# Patient Record
Sex: FEMALE | Race: OTHER | HISPANIC OR LATINO | URBAN - METROPOLITAN AREA
[De-identification: names, ages, dates, MRNs, and addresses within clinical notes are randomized per-mention and may not be internally consistent; named-entity substitution may affect disease eponyms.]

---

## 2018-12-12 ENCOUNTER — EMERGENCY (EMERGENCY)
Facility: HOSPITAL | Age: 23
LOS: 1 days | Discharge: HOME | End: 2018-12-12
Attending: EMERGENCY MEDICINE
Payer: MEDICAID

## 2018-12-12 VITALS
RESPIRATION RATE: 18 BRPM | OXYGEN SATURATION: 99 % | SYSTOLIC BLOOD PRESSURE: 112 MMHG | WEIGHT: 240.08 LBS | TEMPERATURE: 98 F | HEIGHT: 64 IN | DIASTOLIC BLOOD PRESSURE: 66 MMHG | HEART RATE: 111 BPM

## 2018-12-12 VITALS
OXYGEN SATURATION: 98 % | DIASTOLIC BLOOD PRESSURE: 84 MMHG | HEART RATE: 91 BPM | SYSTOLIC BLOOD PRESSURE: 109 MMHG | RESPIRATION RATE: 17 BRPM | TEMPERATURE: 98 F

## 2018-12-12 LAB
ALBUMIN SERPL ELPH-MCNC: 4.3 G/DL — SIGNIFICANT CHANGE UP (ref 3.3–5)
ALP SERPL-CCNC: 59 U/L — SIGNIFICANT CHANGE UP (ref 40–120)
ALT FLD-CCNC: 17 U/L — SIGNIFICANT CHANGE UP (ref 10–45)
ANION GAP SERPL CALC-SCNC: 16 MMOL/L — SIGNIFICANT CHANGE UP (ref 5–17)
APPEARANCE UR: ABNORMAL
AST SERPL-CCNC: 13 U/L — SIGNIFICANT CHANGE UP (ref 10–40)
BACTERIA # UR AUTO: ABNORMAL
BASOPHILS # BLD AUTO: 0.1 K/UL — SIGNIFICANT CHANGE UP (ref 0–0.2)
BASOPHILS NFR BLD AUTO: 0.5 % — SIGNIFICANT CHANGE UP (ref 0–2)
BILIRUB SERPL-MCNC: 0.2 MG/DL — SIGNIFICANT CHANGE UP (ref 0.2–1.2)
BILIRUB UR-MCNC: NEGATIVE — SIGNIFICANT CHANGE UP
BUN SERPL-MCNC: 5 MG/DL — LOW (ref 7–23)
CALCIUM SERPL-MCNC: 10 MG/DL — SIGNIFICANT CHANGE UP (ref 8.4–10.5)
CHLORIDE SERPL-SCNC: 101 MMOL/L — SIGNIFICANT CHANGE UP (ref 96–108)
CO2 SERPL-SCNC: 21 MMOL/L — LOW (ref 22–31)
COLOR SPEC: SIGNIFICANT CHANGE UP
CREAT SERPL-MCNC: 0.55 MG/DL — SIGNIFICANT CHANGE UP (ref 0.5–1.3)
DIFF PNL FLD: NEGATIVE — SIGNIFICANT CHANGE UP
EOSINOPHIL # BLD AUTO: 0.1 K/UL — SIGNIFICANT CHANGE UP (ref 0–0.5)
EOSINOPHIL NFR BLD AUTO: 0.7 % — SIGNIFICANT CHANGE UP (ref 0–6)
EPI CELLS # UR: 4 /HPF — SIGNIFICANT CHANGE UP
GLUCOSE SERPL-MCNC: 95 MG/DL — SIGNIFICANT CHANGE UP (ref 70–99)
GLUCOSE UR QL: NEGATIVE — SIGNIFICANT CHANGE UP
HCT VFR BLD CALC: 41.1 % — SIGNIFICANT CHANGE UP (ref 34.5–45)
HGB BLD-MCNC: 13.3 G/DL — SIGNIFICANT CHANGE UP (ref 11.5–15.5)
HYALINE CASTS # UR AUTO: 2 /LPF — SIGNIFICANT CHANGE UP (ref 0–2)
KETONES UR-MCNC: SIGNIFICANT CHANGE UP
LEUKOCYTE ESTERASE UR-ACNC: ABNORMAL
LIDOCAIN IGE QN: 24 U/L — SIGNIFICANT CHANGE UP (ref 7–60)
LYMPHOCYTES # BLD AUTO: 2.6 K/UL — SIGNIFICANT CHANGE UP (ref 1–3.3)
LYMPHOCYTES # BLD AUTO: 24.3 % — SIGNIFICANT CHANGE UP (ref 13–44)
MCHC RBC-ENTMCNC: 24.4 PG — LOW (ref 27–34)
MCHC RBC-ENTMCNC: 32.3 GM/DL — SIGNIFICANT CHANGE UP (ref 32–36)
MCV RBC AUTO: 75.6 FL — LOW (ref 80–100)
MONOCYTES # BLD AUTO: 0.6 K/UL — SIGNIFICANT CHANGE UP (ref 0–0.9)
MONOCYTES NFR BLD AUTO: 5.7 % — SIGNIFICANT CHANGE UP (ref 2–14)
NEUTROPHILS # BLD AUTO: 7.5 K/UL — HIGH (ref 1.8–7.4)
NEUTROPHILS NFR BLD AUTO: 68.8 % — SIGNIFICANT CHANGE UP (ref 43–77)
NITRITE UR-MCNC: NEGATIVE — SIGNIFICANT CHANGE UP
PH UR: 6 — SIGNIFICANT CHANGE UP (ref 5–8)
PLATELET # BLD AUTO: 421 K/UL — HIGH (ref 150–400)
POTASSIUM SERPL-MCNC: 4.3 MMOL/L — SIGNIFICANT CHANGE UP (ref 3.5–5.3)
POTASSIUM SERPL-SCNC: 4.3 MMOL/L — SIGNIFICANT CHANGE UP (ref 3.5–5.3)
PROT SERPL-MCNC: 7.8 G/DL — SIGNIFICANT CHANGE UP (ref 6–8.3)
PROT UR-MCNC: NEGATIVE — SIGNIFICANT CHANGE UP
RBC # BLD: 5.43 M/UL — HIGH (ref 3.8–5.2)
RBC # FLD: 16.1 % — HIGH (ref 10.3–14.5)
RBC CASTS # UR COMP ASSIST: 9 /HPF — HIGH (ref 0–4)
SODIUM SERPL-SCNC: 138 MMOL/L — SIGNIFICANT CHANGE UP (ref 135–145)
SP GR SPEC: 1.01 — SIGNIFICANT CHANGE UP (ref 1.01–1.02)
UROBILINOGEN FLD QL: NEGATIVE — SIGNIFICANT CHANGE UP
WBC # BLD: 10.9 K/UL — HIGH (ref 3.8–10.5)
WBC # FLD AUTO: 10.9 K/UL — HIGH (ref 3.8–10.5)
WBC UR QL: 33 /HPF — HIGH (ref 0–5)

## 2018-12-12 PROCEDURE — 84702 CHORIONIC GONADOTROPIN TEST: CPT

## 2018-12-12 PROCEDURE — 85027 COMPLETE CBC AUTOMATED: CPT

## 2018-12-12 PROCEDURE — 93005 ELECTROCARDIOGRAM TRACING: CPT

## 2018-12-12 PROCEDURE — 99284 EMERGENCY DEPT VISIT MOD MDM: CPT | Mod: 25

## 2018-12-12 PROCEDURE — 83690 ASSAY OF LIPASE: CPT

## 2018-12-12 PROCEDURE — 87086 URINE CULTURE/COLONY COUNT: CPT

## 2018-12-12 PROCEDURE — 80053 COMPREHEN METABOLIC PANEL: CPT

## 2018-12-12 PROCEDURE — 76815 OB US LIMITED FETUS(S): CPT

## 2018-12-12 PROCEDURE — 81001 URINALYSIS AUTO W/SCOPE: CPT

## 2018-12-12 PROCEDURE — 96374 THER/PROPH/DIAG INJ IV PUSH: CPT

## 2018-12-12 RX ORDER — NITROFURANTOIN MACROCRYSTAL 50 MG
1 CAPSULE ORAL
Qty: 20 | Refills: 0
Start: 2018-12-12 | End: 2018-12-21

## 2018-12-12 RX ORDER — SODIUM CHLORIDE 9 MG/ML
1000 INJECTION INTRAMUSCULAR; INTRAVENOUS; SUBCUTANEOUS ONCE
Qty: 0 | Refills: 0 | Status: COMPLETED | OUTPATIENT
Start: 2018-12-12 | End: 2018-12-12

## 2018-12-12 RX ORDER — FAMOTIDINE 10 MG/ML
20 INJECTION INTRAVENOUS ONCE
Qty: 0 | Refills: 0 | Status: COMPLETED | OUTPATIENT
Start: 2018-12-12 | End: 2018-12-12

## 2018-12-12 RX ORDER — LIDOCAINE 4 G/100G
10 CREAM TOPICAL ONCE
Qty: 0 | Refills: 0 | Status: COMPLETED | OUTPATIENT
Start: 2018-12-12 | End: 2018-12-12

## 2018-12-12 RX ORDER — NITROFURANTOIN MACROCRYSTAL 50 MG
100 CAPSULE ORAL ONCE
Qty: 0 | Refills: 0 | Status: COMPLETED | OUTPATIENT
Start: 2018-12-12 | End: 2018-12-12

## 2018-12-12 RX ORDER — FAMOTIDINE 10 MG/ML
20 INJECTION INTRAVENOUS ONCE
Qty: 0 | Refills: 0 | Status: DISCONTINUED | OUTPATIENT
Start: 2018-12-12 | End: 2018-12-12

## 2018-12-12 RX ADMIN — FAMOTIDINE 20 MILLIGRAM(S): 10 INJECTION INTRAVENOUS at 01:47

## 2018-12-12 RX ADMIN — SODIUM CHLORIDE 1000 MILLILITER(S): 9 INJECTION INTRAMUSCULAR; INTRAVENOUS; SUBCUTANEOUS at 02:35

## 2018-12-12 RX ADMIN — Medication 100 MILLIGRAM(S): at 04:47

## 2018-12-12 RX ADMIN — Medication 30 MILLILITER(S): at 04:47

## 2018-12-12 RX ADMIN — LIDOCAINE 10 MILLILITER(S): 4 CREAM TOPICAL at 01:49

## 2018-12-12 RX ADMIN — Medication 30 MILLILITER(S): at 01:49

## 2018-12-12 NOTE — ED PROVIDER NOTE - PROGRESS NOTE DETAILS
gerd improved after medications. Patient tolerating PO.  on TVUS. Patient ua shows uti. will treat with abx. d/c home

## 2018-12-12 NOTE — ED PROVIDER NOTE - NSFOLLOWUPINSTRUCTIONS_ED_ALL_ED_FT
1. Take macrobid 100 mg twice a day for 10 days for your urinary tract infection.   2. Take pepcid 20 mg by mouth twice a day for your heartburn.   3. Avoid spicy foods.   4. Follow up with OBGYN in 3-5 days.   5. Return to the ER immediately for worsening symptoms, vomiting, fever, abdominal pain.

## 2018-12-12 NOTE — ED PROVIDER NOTE - OBJECTIVE STATEMENT
23y f of  origin  8 wks preg confirmed IUP by U/S, LMP 10/14, p/w epigastric pain since this AM, unrelieved by tums. Describes her discomfort as "burning". Pt has never had gastritis or GERD symptoms before. Pt states her pain starts in her epigastric region and radiates up her chest and to her throat. He had one episode of vomiting this AM. Pt denies pleuritic symptoms. She is tachycardic in the room, VS otherwise stable. Denies vaginal bleeding or lower abdominal pain.

## 2018-12-12 NOTE — ED ADULT NURSE NOTE - NSIMPLEMENTINTERV_GEN_ALL_ED
Implemented All Universal Safety Interventions:  [place] to call system. Call bell, personal items and telephone within reach. Instruct patient to call for assistance. Room bathroom lighting operational. Non-slip footwear when patient is off stretcher. Physically safe environment: no spills, clutter or unnecessary equipment. Stretcher in lowest position, wheels locked, appropriate side rails in place.

## 2018-12-12 NOTE — ED PROVIDER NOTE - NSFOLLOWUPCLINICS_GEN_ALL_ED_FT
[facility] Gynecology and Obstetrics  Gynceology/OB  [address] [place] [address]  Phone: [phone]  Fax:   Follow Up Time:

## 2018-12-12 NOTE — ED PROVIDER NOTE - CARE PLAN
Principal Discharge DX:	Gastroesophageal reflux disease without esophagitis  Secondary Diagnosis:	Acute cystitis without hematuria

## 2018-12-12 NOTE — ED ADULT NURSE NOTE - OBJECTIVE STATEMENT
24 y/o female  8 wks preg confirmed IUP by U/S, LMP 10/14, presenting to ED c/o epigastric pain since this AM, unrelieved by tums. Describes her discomfort as "burning". Pt has never had gastritis or GERD symptoms before. Pt states her pain starts in her epigastric region and radiates up her chest and to her throat. He had one episode of vomiting this AM. Pt denies pleuritic symptoms. Pt denies headache, dizziness, chest pain, palpitations, cough, SOB, vaginal bleeding, n/v/d, urinary symptoms, fevers, chills, weakness at this time.

## 2018-12-12 NOTE — ED PROVIDER NOTE - MEDICAL DECISION MAKING DETAILS
23 year old female with IUP 8 weeks pregnant presents to the ER with chest wall burning c/w patient prior episodes of GERD, states this has been around longer than prior episodes. will get labs ua, gi cocktail, check FHR, reassess

## 2018-12-13 LAB
CULTURE RESULTS: SIGNIFICANT CHANGE UP
SPECIMEN SOURCE: SIGNIFICANT CHANGE UP

## 2018-12-19 PROCEDURE — 76815 OB US LIMITED FETUS(S): CPT | Mod: 26

## 2019-03-24 ENCOUNTER — EMERGENCY (EMERGENCY)
Facility: HOSPITAL | Age: 24
LOS: 1 days | Discharge: HOME | End: 2019-03-24
Attending: EMERGENCY MEDICINE
Payer: MEDICAID

## 2019-03-24 VITALS
HEART RATE: 103 BPM | DIASTOLIC BLOOD PRESSURE: 85 MMHG | SYSTOLIC BLOOD PRESSURE: 126 MMHG | WEIGHT: 285.06 LBS | RESPIRATION RATE: 20 BRPM | OXYGEN SATURATION: 100 % | HEIGHT: 65 IN | TEMPERATURE: 98 F

## 2019-03-24 PROCEDURE — 99282 EMERGENCY DEPT VISIT SF MDM: CPT

## 2019-03-24 PROCEDURE — 99284 EMERGENCY DEPT VISIT MOD MDM: CPT | Mod: 25

## 2019-03-24 PROCEDURE — 64400 NJX AA&/STRD TRIGEMINAL NRV: CPT | Mod: LT

## 2019-03-24 RX ORDER — ACETAMINOPHEN 500 MG
650 TABLET ORAL ONCE
Qty: 0 | Refills: 0 | Status: COMPLETED | OUTPATIENT
Start: 2019-03-24 | End: 2019-03-24

## 2019-03-24 RX ADMIN — Medication 650 MILLIGRAM(S): at 14:10

## 2019-03-24 NOTE — CONSULT NOTE ADULT - SUBJECTIVE AND OBJECTIVE BOX
25 y/o F who is 22 weeks pregnant presents w/ dental pain. Pt presents with . Pt reports 2 weeks of tooth pain (tooth 15). Was recommended by her OB to avoid seeing the dentist to have work done on this tooth due to her pregnancy status. Pain acutely worsened last night which prompted her ED visit today. Has been taking Tylenol for pain, last took Tylenol approx 4 hours prior to ED arrival. Denies fevers at home. Reports pain with touch to the tooth    PAST MEDICAL & SURGICAL HISTORY:  No pertinent past medical history  No significant past surgical history    Allergies  No Known Allergies    Vital Signs Last 24 Hrs  T(C): 36.7 (24 Mar 2019 10:48), Max: 36.7 (24 Mar 2019 10:48)  T(F): 98.1 (24 Mar 2019 10:48), Max: 98.1 (24 Mar 2019 10:48)  HR: 103 (24 Mar 2019 10:48) (103 - 103)  BP: 126/85 (24 Mar 2019 10:48) (126/85 - 126/85)  BP(mean): --  RR: 20 (24 Mar 2019 10:48) (20 - 20)  SpO2: 100% (24 Mar 2019 10:48) (100% - 100%)    EOE:  TMJ ( -  ) clicks                    ( -   ) pops                    (   - ) crepitus             Mandible FROM             Facial bones and MOM grossly intact             ( -  ) trismus             ( -  ) LAD             ( -  ) swelling             ( -  ) asymmetry             ( -  ) palpation             ( -  ) SOB             ( -  ) dysphagia             ( -  ) LOC    IOE:  permanent dentition: multiple carious teeth           tongue/FOM No pathology noted           labial/buccal mucosa No pathology noted           ( -  ) percussion           ( +  ) palpation           ( -  ) swelling     Radiographs: No radiographs taken    ASSESSMENT:  Extraoral examination was within normal limits.   Intraoral examination shows generalized gross caries. Erythematous and edematous gingiva present tooth 16, upper left third molar. No other soft tissue lesions present. No swellings present.  Tooth 16 is severely decayed.     Dx:   Tooth 16: Symptomatic pulpitis    Recommendation:  Lidocaine block tooth 16.     PROCEDURE:  Verbalconsent given.   1 tab benzocaine applied. 3.0 cc 1% lidocaine for buccal infiltration tooth 16    RECOMMENDATIONS:   1) Pain management, as per ED  2) Dental F/U with outpatient dentist for comprehensive dental care and definitive treatment tooth 16.   3) If any difficulty swallowing/breathing, fever occur, page dental.     Walter Whitney, DDS  [phone]

## 2019-03-24 NOTE — ED PROVIDER NOTE - NSFOLLOWUPCLINICS_GEN_ALL_ED_FT
[facility]  Dental  [address] [place] [id number]  Phone: [phone]  Fax:   Follow Up Time:

## 2019-03-24 NOTE — ED ADULT NURSE NOTE - OBJECTIVE STATEMENT
Pt is a 25 yo female 22 weeks pregnant co dental pain. Pt reports the dental pain starting 2 weeks ago. She recently saw her OB who recommended by her OB to avoid seeing the dentist to have work done on this tooth due to her pregnancy status. Pain acutely worsened last night which prompted her ED visit today. Has been taking Tylenol for pain, last took Tylenol approx 4 hours prior to ED arrival. Denies fevers at home. Reports pain with touch to the tooth. Pt is a 25 yo female 22 weeks pregnant co dental pain. Pt reports the dental pain starting 2 weeks ago. She recently saw her OB who recommended her avoiding having any work on her teeth done until after the pregnancy. Pt reports the pain now a 9/10 and is returning despite tylenol use at home. Pt denies any fevers no CP or SOB no N/V/D no cough or chills. Pt denies any other complaints at this time

## 2019-03-24 NOTE — ED PROVIDER NOTE - NSFOLLOWUPINSTRUCTIONS_ED_ALL_ED_FT
1) Follow up with your doctor this week. If you were unable to find a dentist you can contact the dental clinic at the number provided  2) Return to the ED immediately for new or worsening symptoms that do no improve with over the counter medications  3) Please take Tylenol 650 mg every 4-6 hours as needed for pain. Please do not exceed more than 4,000mg of Tylenol in a day. You were given 650 mg in the emergency department today  4) Avoid chewing on the left sided of your mouth

## 2019-03-24 NOTE — ED PROVIDER NOTE - PHYSICAL EXAMINATION
Gen: NAD, AOx3, able to make needs known, non-toxic  Head: NCAT  HEENT: EOMI, oral mucosa moist, normal conjunctiva. Tooth 15 erupting from gingival mucosa. Tender to palpation. No fluctuance.   Lung: CTAB, no respiratory distress  CV: RRR, no murmurs  Abd: soft, NTND, no guarding  MSK: no visible deformities  Neuro: No focal sensory or motor deficits  Skin: Warm, well perfused  Psych: normal affect

## 2019-03-24 NOTE — ED PROVIDER NOTE - CLINICAL SUMMARY MEDICAL DECISION MAKING FREE TEXT BOX
Attending MD Madrigal: 24F 22weeks pregnant presenting with pain associated with erupting and impacted left maxillary wisdom tooth. No e/o odontogenic infection. Options for analgesia limited in this pregnant patient thus will obtain dental consult for further guidance

## 2019-03-24 NOTE — ED PROVIDER NOTE - NS ED ROS FT
GENERAL: No fever or chills  EYES: No change in vision  HEENT: Tooth pain per HPI. No trouble swallowing or speaking  CARDIAC: No chest pain  PULMONARY: No cough or SOB  GI: No abdominal pain, no nausea or no vomiting, no diarrhea or constipation  : No changes in urination  SKIN: No rashes  NEURO: No headache, no numbness  MSK: No joint pain  Otherwise as HPI or negative.

## 2019-05-10 ENCOUNTER — OUTPATIENT (OUTPATIENT)
Dept: OUTPATIENT SERVICES | Facility: HOSPITAL | Age: 24
LOS: 1 days | End: 2019-05-10
Payer: MEDICAID

## 2019-05-10 DIAGNOSIS — O26.899 OTHER SPECIFIED PREGNANCY RELATED CONDITIONS, UNSPECIFIED TRIMESTER: ICD-10-CM

## 2019-05-10 DIAGNOSIS — Z3A.00 WEEKS OF GESTATION OF PREGNANCY NOT SPECIFIED: ICD-10-CM

## 2019-05-10 LAB
AMNISURE ROM (RUPTURE OF MEMBRANES): NEGATIVE — SIGNIFICANT CHANGE UP
APPEARANCE UR: CLEAR — SIGNIFICANT CHANGE UP
BILIRUB UR-MCNC: NEGATIVE — SIGNIFICANT CHANGE UP
COLOR SPEC: YELLOW — SIGNIFICANT CHANGE UP
DIFF PNL FLD: NEGATIVE — SIGNIFICANT CHANGE UP
GLUCOSE UR QL: NEGATIVE — SIGNIFICANT CHANGE UP
KETONES UR-MCNC: ABNORMAL
LEUKOCYTE ESTERASE UR-ACNC: ABNORMAL
NITRITE UR-MCNC: NEGATIVE — SIGNIFICANT CHANGE UP
PH UR: 6 — SIGNIFICANT CHANGE UP (ref 5–8)
PROT UR-MCNC: NEGATIVE — SIGNIFICANT CHANGE UP
SP GR SPEC: 1.01 — SIGNIFICANT CHANGE UP (ref 1.01–1.02)
UROBILINOGEN FLD QL: NEGATIVE — SIGNIFICANT CHANGE UP

## 2019-05-10 RX ORDER — SODIUM CHLORIDE 9 MG/ML
1000 INJECTION, SOLUTION INTRAVENOUS ONCE
Refills: 0 | Status: COMPLETED | OUTPATIENT
Start: 2019-05-10 | End: 2019-05-10

## 2019-05-10 RX ORDER — CEFAZOLIN SODIUM 1 G
2000 VIAL (EA) INJECTION ONCE
Refills: 0 | Status: COMPLETED | OUTPATIENT
Start: 2019-05-10 | End: 2019-05-10

## 2019-05-10 RX ADMIN — Medication 100 MILLIGRAM(S): at 23:30

## 2019-05-10 RX ADMIN — SODIUM CHLORIDE 1000 MILLILITER(S): 9 INJECTION, SOLUTION INTRAVENOUS at 22:40

## 2019-05-10 RX ADMIN — SODIUM CHLORIDE 1000 MILLILITER(S): 9 INJECTION, SOLUTION INTRAVENOUS at 23:30

## 2019-05-11 ENCOUNTER — OUTPATIENT (OUTPATIENT)
Dept: OUTPATIENT SERVICES | Facility: HOSPITAL | Age: 24
LOS: 1 days | End: 2019-05-11
Payer: MEDICAID

## 2019-05-11 DIAGNOSIS — Z3A.00 WEEKS OF GESTATION OF PREGNANCY NOT SPECIFIED: ICD-10-CM

## 2019-05-11 DIAGNOSIS — O26.899 OTHER SPECIFIED PREGNANCY RELATED CONDITIONS, UNSPECIFIED TRIMESTER: ICD-10-CM

## 2019-05-11 PROCEDURE — G0463: CPT

## 2019-05-11 PROCEDURE — 76817 TRANSVAGINAL US OBSTETRIC: CPT | Mod: 26

## 2019-05-11 PROCEDURE — 59025 FETAL NON-STRESS TEST: CPT

## 2019-05-11 PROCEDURE — 76815 OB US LIMITED FETUS(S): CPT | Mod: 26

## 2019-05-11 PROCEDURE — 87086 URINE CULTURE/COLONY COUNT: CPT

## 2019-05-11 PROCEDURE — 81001 URINALYSIS AUTO W/SCOPE: CPT

## 2019-05-11 PROCEDURE — 76817 TRANSVAGINAL US OBSTETRIC: CPT

## 2019-05-11 PROCEDURE — 76818 FETAL BIOPHYS PROFILE W/NST: CPT

## 2019-05-11 PROCEDURE — 76818 FETAL BIOPHYS PROFILE W/NST: CPT | Mod: 26

## 2019-05-11 PROCEDURE — 76815 OB US LIMITED FETUS(S): CPT

## 2019-05-11 RX ORDER — SODIUM CHLORIDE 9 MG/ML
1000 INJECTION, SOLUTION INTRAVENOUS ONCE
Refills: 0 | Status: DISCONTINUED | OUTPATIENT
Start: 2019-05-11 | End: 2019-05-25

## 2019-05-11 RX ORDER — NITROFURANTOIN MACROCRYSTAL 50 MG
1 CAPSULE ORAL
Qty: 14 | Refills: 0
Start: 2019-05-11 | End: 2019-05-17

## 2019-05-11 RX ORDER — ACETAMINOPHEN 500 MG
650 TABLET ORAL ONCE
Refills: 0 | Status: COMPLETED | OUTPATIENT
Start: 2019-05-11 | End: 2019-05-11

## 2019-05-11 RX ADMIN — Medication 325 MILLIGRAM(S): at 02:56

## 2019-05-12 PROBLEM — Z78.9 OTHER SPECIFIED HEALTH STATUS: Chronic | Status: ACTIVE | Noted: 2019-03-24

## 2019-05-12 LAB
CULTURE RESULTS: SIGNIFICANT CHANGE UP
SPECIMEN SOURCE: SIGNIFICANT CHANGE UP

## 2019-06-28 ENCOUNTER — INPATIENT (INPATIENT)
Facility: HOSPITAL | Age: 24
LOS: 1 days | Discharge: HOME | End: 2019-06-30
Attending: OBSTETRICS & GYNECOLOGY | Admitting: OBSTETRICS & GYNECOLOGY
Payer: MEDICAID

## 2019-06-28 ENCOUNTER — TRANSCRIPTION ENCOUNTER (OUTPATIENT)
Age: 24
End: 2019-06-28

## 2019-06-28 ENCOUNTER — RESULT REVIEW (OUTPATIENT)
Age: 24
End: 2019-06-28

## 2019-06-28 VITALS — WEIGHT: 244.71 LBS | HEIGHT: 65 IN

## 2019-06-28 DIAGNOSIS — Z34.80 ENCOUNTER FOR SUPERVISION OF OTHER NORMAL PREGNANCY, UNSPECIFIED TRIMESTER: ICD-10-CM

## 2019-06-28 DIAGNOSIS — Z3A.00 WEEKS OF GESTATION OF PREGNANCY NOT SPECIFIED: ICD-10-CM

## 2019-06-28 DIAGNOSIS — O26.899 OTHER SPECIFIED PREGNANCY RELATED CONDITIONS, UNSPECIFIED TRIMESTER: ICD-10-CM

## 2019-06-28 LAB
APTT BLD: 30.3 SEC — SIGNIFICANT CHANGE UP (ref 27.5–36.3)
BASOPHILS # BLD AUTO: 0.06 K/UL — SIGNIFICANT CHANGE UP (ref 0–0.2)
BASOPHILS NFR BLD AUTO: 0.4 % — SIGNIFICANT CHANGE UP (ref 0–2)
EOSINOPHIL # BLD AUTO: 0.1 K/UL — SIGNIFICANT CHANGE UP (ref 0–0.5)
EOSINOPHIL NFR BLD AUTO: 0.7 % — SIGNIFICANT CHANGE UP (ref 0–6)
FIBRINOGEN PPP-MCNC: 982 MG/DL — HIGH (ref 350–510)
HCT VFR BLD CALC: 35.8 % — SIGNIFICANT CHANGE UP (ref 34.5–45)
HGB BLD-MCNC: 11.1 G/DL — LOW (ref 11.5–15.5)
HIV 1 & 2 AB SERPL IA.RAPID: SIGNIFICANT CHANGE UP
HIV 1+2 AB+HIV1 P24 AG SERPL QL IA: SIGNIFICANT CHANGE UP
IMM GRANULOCYTES NFR BLD AUTO: 1.2 % — SIGNIFICANT CHANGE UP (ref 0–1.5)
INR BLD: 0.97 RATIO — SIGNIFICANT CHANGE UP (ref 0.88–1.16)
LYMPHOCYTES # BLD AUTO: 2.91 K/UL — SIGNIFICANT CHANGE UP (ref 1–3.3)
LYMPHOCYTES # BLD AUTO: 21.6 % — SIGNIFICANT CHANGE UP (ref 13–44)
MCHC RBC-ENTMCNC: 24.4 PG — LOW (ref 27–34)
MCHC RBC-ENTMCNC: 31 GM/DL — LOW (ref 32–36)
MCV RBC AUTO: 78.9 FL — LOW (ref 80–100)
MONOCYTES # BLD AUTO: 0.83 K/UL — SIGNIFICANT CHANGE UP (ref 0–0.9)
MONOCYTES NFR BLD AUTO: 6.2 % — SIGNIFICANT CHANGE UP (ref 2–14)
NEUTROPHILS # BLD AUTO: 9.4 K/UL — HIGH (ref 1.8–7.4)
NEUTROPHILS NFR BLD AUTO: 69.9 % — SIGNIFICANT CHANGE UP (ref 43–77)
NRBC # BLD: 0 /100 WBCS — SIGNIFICANT CHANGE UP (ref 0–0)
PLATELET # BLD AUTO: 355 K/UL — SIGNIFICANT CHANGE UP (ref 150–400)
PROTHROM AB SERPL-ACNC: 10.8 SEC — SIGNIFICANT CHANGE UP (ref 10–12.9)
RBC # BLD: 4.54 M/UL — SIGNIFICANT CHANGE UP (ref 3.8–5.2)
RBC # FLD: 15.6 % — HIGH (ref 10.3–14.5)
T PALLIDUM AB TITR SER: NEGATIVE — SIGNIFICANT CHANGE UP
WBC # BLD: 13.46 K/UL — HIGH (ref 3.8–10.5)
WBC # FLD AUTO: 13.46 K/UL — HIGH (ref 3.8–10.5)

## 2019-06-28 PROCEDURE — 88307 TISSUE EXAM BY PATHOLOGIST: CPT | Mod: 26

## 2019-06-28 RX ORDER — DIBUCAINE 1 %
1 OINTMENT (GRAM) RECTAL EVERY 6 HOURS
Refills: 0 | Status: DISCONTINUED | OUTPATIENT
Start: 2019-06-28 | End: 2019-06-30

## 2019-06-28 RX ORDER — SODIUM CHLORIDE 9 MG/ML
1000 INJECTION, SOLUTION INTRAVENOUS
Refills: 0 | Status: DISCONTINUED | OUTPATIENT
Start: 2019-06-28 | End: 2019-06-28

## 2019-06-28 RX ORDER — AMPICILLIN TRIHYDRATE 250 MG
2 CAPSULE ORAL ONCE
Refills: 0 | Status: COMPLETED | OUTPATIENT
Start: 2019-06-28 | End: 2019-06-28

## 2019-06-28 RX ORDER — PRAMOXINE HYDROCHLORIDE 150 MG/15G
1 AEROSOL, FOAM RECTAL EVERY 4 HOURS
Refills: 0 | Status: DISCONTINUED | OUTPATIENT
Start: 2019-06-28 | End: 2019-06-30

## 2019-06-28 RX ORDER — OXYTOCIN 10 UNIT/ML
333.33 VIAL (ML) INJECTION
Qty: 20 | Refills: 0 | Status: DISCONTINUED | OUTPATIENT
Start: 2019-06-28 | End: 2019-06-30

## 2019-06-28 RX ORDER — GLYCERIN ADULT
1 SUPPOSITORY, RECTAL RECTAL AT BEDTIME
Refills: 0 | Status: DISCONTINUED | OUTPATIENT
Start: 2019-06-28 | End: 2019-06-30

## 2019-06-28 RX ORDER — HYDROCORTISONE 1 %
1 OINTMENT (GRAM) TOPICAL EVERY 6 HOURS
Refills: 0 | Status: DISCONTINUED | OUTPATIENT
Start: 2019-06-28 | End: 2019-06-30

## 2019-06-28 RX ORDER — ENOXAPARIN SODIUM 100 MG/ML
60 INJECTION SUBCUTANEOUS EVERY 24 HOURS
Refills: 0 | Status: COMPLETED | OUTPATIENT
Start: 2019-06-29 | End: 2019-06-30

## 2019-06-28 RX ORDER — LANOLIN
1 OINTMENT (GRAM) TOPICAL EVERY 6 HOURS
Refills: 0 | Status: DISCONTINUED | OUTPATIENT
Start: 2019-06-28 | End: 2019-06-30

## 2019-06-28 RX ORDER — SIMETHICONE 80 MG/1
80 TABLET, CHEWABLE ORAL EVERY 4 HOURS
Refills: 0 | Status: DISCONTINUED | OUTPATIENT
Start: 2019-06-28 | End: 2019-06-30

## 2019-06-28 RX ORDER — SODIUM CHLORIDE 9 MG/ML
3 INJECTION INTRAMUSCULAR; INTRAVENOUS; SUBCUTANEOUS EVERY 8 HOURS
Refills: 0 | Status: DISCONTINUED | OUTPATIENT
Start: 2019-06-28 | End: 2019-06-30

## 2019-06-28 RX ORDER — IBUPROFEN 200 MG
600 TABLET ORAL EVERY 6 HOURS
Refills: 0 | Status: DISCONTINUED | OUTPATIENT
Start: 2019-06-28 | End: 2019-06-30

## 2019-06-28 RX ORDER — DOCUSATE SODIUM 100 MG
100 CAPSULE ORAL
Refills: 0 | Status: DISCONTINUED | OUTPATIENT
Start: 2019-06-28 | End: 2019-06-30

## 2019-06-28 RX ORDER — OXYCODONE AND ACETAMINOPHEN 5; 325 MG/1; MG/1
1 TABLET ORAL EVERY 4 HOURS
Refills: 0 | Status: DISCONTINUED | OUTPATIENT
Start: 2019-06-28 | End: 2019-06-30

## 2019-06-28 RX ORDER — BENZOCAINE 10 %
1 GEL (GRAM) MUCOUS MEMBRANE EVERY 6 HOURS
Refills: 0 | Status: DISCONTINUED | OUTPATIENT
Start: 2019-06-28 | End: 2019-06-30

## 2019-06-28 RX ORDER — DIPHENHYDRAMINE HCL 50 MG
25 CAPSULE ORAL EVERY 6 HOURS
Refills: 0 | Status: DISCONTINUED | OUTPATIENT
Start: 2019-06-28 | End: 2019-06-30

## 2019-06-28 RX ORDER — AMPICILLIN TRIHYDRATE 250 MG
1 CAPSULE ORAL EVERY 4 HOURS
Refills: 0 | Status: DISCONTINUED | OUTPATIENT
Start: 2019-06-28 | End: 2019-06-28

## 2019-06-28 RX ORDER — CITRIC ACID/SODIUM CITRATE 300-500 MG
15 SOLUTION, ORAL ORAL EVERY 6 HOURS
Refills: 0 | Status: DISCONTINUED | OUTPATIENT
Start: 2019-06-28 | End: 2019-06-28

## 2019-06-28 RX ORDER — OXYTOCIN 10 UNIT/ML
2 VIAL (ML) INJECTION
Qty: 30 | Refills: 0 | Status: DISCONTINUED | OUTPATIENT
Start: 2019-06-28 | End: 2019-06-28

## 2019-06-28 RX ORDER — TETANUS TOXOID, REDUCED DIPHTHERIA TOXOID AND ACELLULAR PERTUSSIS VACCINE, ADSORBED 5; 2.5; 8; 8; 2.5 [IU]/.5ML; [IU]/.5ML; UG/.5ML; UG/.5ML; UG/.5ML
0.5 SUSPENSION INTRAMUSCULAR ONCE
Refills: 0 | Status: DISCONTINUED | OUTPATIENT
Start: 2019-06-28 | End: 2019-06-30

## 2019-06-28 RX ORDER — AER TRAVELER 0.5 G/1
1 SOLUTION RECTAL; TOPICAL EVERY 4 HOURS
Refills: 0 | Status: DISCONTINUED | OUTPATIENT
Start: 2019-06-28 | End: 2019-06-30

## 2019-06-28 RX ORDER — MAGNESIUM HYDROXIDE 400 MG/1
30 TABLET, CHEWABLE ORAL
Refills: 0 | Status: DISCONTINUED | OUTPATIENT
Start: 2019-06-28 | End: 2019-06-30

## 2019-06-28 RX ADMIN — Medication 108 GRAM(S): at 07:59

## 2019-06-28 RX ADMIN — Medication 600 MILLIGRAM(S): at 19:18

## 2019-06-28 RX ADMIN — SODIUM CHLORIDE 125 MILLILITER(S): 9 INJECTION, SOLUTION INTRAVENOUS at 02:45

## 2019-06-28 RX ADMIN — Medication 600 MILLIGRAM(S): at 18:46

## 2019-06-28 RX ADMIN — SODIUM CHLORIDE 125 MILLILITER(S): 9 INJECTION, SOLUTION INTRAVENOUS at 11:47

## 2019-06-28 RX ADMIN — SODIUM CHLORIDE 125 MILLILITER(S): 9 INJECTION, SOLUTION INTRAVENOUS at 04:10

## 2019-06-28 RX ADMIN — Medication 1000 MILLIUNIT(S)/MIN: at 15:50

## 2019-06-28 RX ADMIN — Medication 216 GRAM(S): at 03:27

## 2019-06-28 RX ADMIN — Medication 1 TABLET(S): at 18:45

## 2019-06-28 RX ADMIN — SODIUM CHLORIDE 3 MILLILITER(S): 9 INJECTION INTRAMUSCULAR; INTRAVENOUS; SUBCUTANEOUS at 21:46

## 2019-06-28 RX ADMIN — Medication 2 MILLIUNIT(S)/MIN: at 04:28

## 2019-06-28 RX ADMIN — Medication 12 MILLIGRAM(S): at 03:20

## 2019-06-28 RX ADMIN — Medication 108 GRAM(S): at 13:05

## 2019-06-28 NOTE — DISCHARGE NOTE OB - CARE PROVIDER_API CALL
Betty Cunha)  Obstetrics and Gynecology  [address], [address]  [place] [address]  Phone: [phone]  Fax: [phone]  Follow Up Time: 1 month

## 2019-06-28 NOTE — DISCHARGE NOTE OB - MATERIALS PROVIDED
Discharge Medication Information for Patients and Families Pocket Guide/Breastfeeding Mother’s Support Group Information/Guide to Postpartum Care/Birth Certificate Instructions/Letter of Medical Neccessity/  Immunization Record/Back To Sleep Handout/Breastfeeding Guide and Packet/[facility]  Screening Program/Vaccinations/[facility] Hearing Screen Program/Shaken Baby Prevention Handout

## 2019-06-28 NOTE — DISCHARGE NOTE OB - MEDICATION SUMMARY - MEDICATIONS TO TAKE
I will START or STAY ON the medications listed below when I get home from the hospital:    ibuprofen 600 mg oral tablet  -- 1 tab(s) by mouth every 6 hours, As Needed -for moderate pain   -- Do not take this drug if you are pregnant.  It is very important that you take or use this exactly as directed.  Do not skip doses or discontinue unless directed by your doctor.  May cause drowsiness or dizziness.  Obtain medical advice before taking any non-prescription drugs as some may affect the action of this medication.  Take with food or milk.    -- Indication: For moderate pain    witch hazel 50% topical pad  -- 1 application on skin every 4 hours, As needed, Perineal discomfort  -- Indication: For vaginal discomfort    PreNata oral tablet, chewable  -- 1 tab(s) by mouth once a day  -- Indication: For Supplement

## 2019-06-28 NOTE — DISCHARGE NOTE OB - PLAN OF CARE
delivered at 36 4/7wks ob check in 5weeks call and set up appointment no sex nothing in vagina no heavy lifting no pushing no straining no strenuous activities  pain medication as needed; stool softener; dulcolax as needed if constipated  walk for exercise: helps recovery   continue prenatal vitamins daily especially whole course of breastfeeding  see your OB in the office for follow up post partum check 4-5weeks call the office to set up an appointment

## 2019-06-28 NOTE — DISCHARGE NOTE OB - CARE PLAN
Principal Discharge DX:	 (normal spontaneous vaginal delivery)  Goal:	ob check in 5weeks call and set up appointment  Assessment and plan of treatment:	no sex nothing in vagina no heavy lifting no pushing no straining no strenuous activities  pain medication as needed; stool softener; dulcolax as needed if constipated  walk for exercise: helps recovery   continue prenatal vitamins daily especially whole course of breastfeeding  see your OB in the office for follow up post partum check 4-5weeks call the office to set up an appointment  Secondary Diagnosis:	 delivery  Goal:	delivered at 36 4/7wks

## 2019-06-28 NOTE — DISCHARGE NOTE OB - MEDICATION SUMMARY - MEDICATIONS TO STOP TAKING
I will STOP taking the medications listed below when I get home from the hospital:    Macrobid 100 mg oral capsule  -- 1 cap(s) by mouth 2 times a day   -- Finish all this medication unless otherwise directed by prescriber.  May discolor urine or feces.  Take with food or milk.    aluminum hydroxide/magnesium hydroxide/simethicone 200 mg-200 mg-20 mg/5 mL oral suspension  -- 10 milliliter(s) by mouth 4 times a day (after meals and at bedtime)   -- Shake well before use.    Prometrium 200 mg oral capsule  -- 1 cap(s) vaginally once a day    Macrobid 100 mg oral capsule  -- 1 cap(s) by mouth 2 times a day   -- Finish all this medication unless otherwise directed by prescriber.  May discolor urine or feces.  Take with food or milk.

## 2019-06-28 NOTE — DISCHARGE NOTE OB - PATIENT PORTAL LINK FT
You can access the [email or web address] Patient Portal, offered by [facility], by registering with the following website: [email or web address]

## 2019-06-29 LAB
HCT VFR BLD CALC: 30.8 % — LOW (ref 34.5–45)
HGB BLD-MCNC: 9.5 G/DL — LOW (ref 11.5–15.5)

## 2019-06-29 RX ADMIN — ENOXAPARIN SODIUM 60 MILLIGRAM(S): 100 INJECTION SUBCUTANEOUS at 05:42

## 2019-06-29 RX ADMIN — SODIUM CHLORIDE 3 MILLILITER(S): 9 INJECTION INTRAMUSCULAR; INTRAVENOUS; SUBCUTANEOUS at 06:18

## 2019-06-29 RX ADMIN — Medication 1 TABLET(S): at 12:27

## 2019-06-29 RX ADMIN — Medication 600 MILLIGRAM(S): at 16:38

## 2019-06-29 RX ADMIN — SODIUM CHLORIDE 3 MILLILITER(S): 9 INJECTION INTRAMUSCULAR; INTRAVENOUS; SUBCUTANEOUS at 23:14

## 2019-06-29 RX ADMIN — Medication 600 MILLIGRAM(S): at 05:41

## 2019-06-29 RX ADMIN — SODIUM CHLORIDE 3 MILLILITER(S): 9 INJECTION INTRAMUSCULAR; INTRAVENOUS; SUBCUTANEOUS at 12:41

## 2019-06-29 RX ADMIN — Medication 600 MILLIGRAM(S): at 17:10

## 2019-06-29 RX ADMIN — Medication 600 MILLIGRAM(S): at 06:25

## 2019-06-30 VITALS
DIASTOLIC BLOOD PRESSURE: 66 MMHG | HEART RATE: 98 BPM | OXYGEN SATURATION: 98 % | RESPIRATION RATE: 18 BRPM | TEMPERATURE: 97 F | SYSTOLIC BLOOD PRESSURE: 101 MMHG

## 2019-06-30 RX ORDER — AER TRAVELER 0.5 G/1
1 SOLUTION RECTAL; TOPICAL
Qty: 40 | Refills: 0
Start: 2019-06-30 | End: 2019-07-09

## 2019-06-30 RX ORDER — IBUPROFEN 200 MG
1 TABLET ORAL
Qty: 20 | Refills: 0
Start: 2019-06-30 | End: 2019-07-04

## 2019-06-30 RX ORDER — PROGESTERONE 200 MG/1
1 CAPSULE, LIQUID FILLED ORAL
Qty: 0 | Refills: 0 | DISCHARGE

## 2019-06-30 RX ADMIN — Medication 600 MILLIGRAM(S): at 03:35

## 2019-06-30 RX ADMIN — ENOXAPARIN SODIUM 60 MILLIGRAM(S): 100 INJECTION SUBCUTANEOUS at 03:00

## 2019-06-30 RX ADMIN — Medication 600 MILLIGRAM(S): at 02:58

## 2019-06-30 NOTE — PROGRESS NOTE ADULT - SUBJECTIVE AND OBJECTIVE BOX
Patient evaluated at bedside, offers no acute complaints.  Resting comfortably with baby at bedside.  Tolerating regular diet, Voiding without difficulty; +flatus, +BM  Currently breast and bottlefeeding.  Denies fever/chills, nausea/vomiting, headache, dizziness, chest pains, shortness of breath, palpitations    Vital Signs Last 24 Hrs  T(C): 36.2 (30 Jun 2019 05:38), Max: 36.7 (29 Jun 2019 19:09)  T(F): 97.2 (30 Jun 2019 05:38), Max: 98.1 (29 Jun 2019 19:09)  HR: 98 (30 Jun 2019 05:38) (96 - 98)  BP: 101/66 (30 Jun 2019 05:38) (101/66 - 102/69)  RR: 18 (30 Jun 2019 05:38) (18 - 18)  SpO2: 98% (30 Jun 2019 05:38) (98% - 98%)    PE: Pt appears comfortable, NAD, AAOx3  Chest: CTA bilaterally, no W/R/R  Cardiac: RRR  Breasts: soft, NT/nonengorged bilaterally; no masses, no erythema  Abd: soft; NT; no guarding or rebound; +BS x4 quad; Fundus firm NT below umbilicus  Gyn: mild lochia  Ext: soft, NT; DTRs 2+ bilaterally; no worsening edema                          9.5    x     )-----------( x        ( 29 Jun 2019 06:48 )             30.8       d/w Dr. Luis
OBGYN PA NOTE PPD1  Patient seen and evaluated at bedside,  resting comfortably w/o any acute  complaints.  Denies fever, HA, CP, SOB, N/V/D, dizziness, palpitations, worsening abdominal pain, worsening vaginal bleeding, or any other concerns.  Ambulating and voiding without difficulty.  Passing flatus and tolerating regular diet.  Attempting to breastfeed.     Vital Signs Last 24 Hrs  T(C): 36.6 (2019 06:45), Max: 37.3 (2019 18:27)  T(F): 97.9 (2019 06:45), Max: 99.1 (2019 18:27)  HR: 103 (2019 06:45) (98 - 126)  BP: 100/66 (2019 06:45) (100/66 - 122/57)  BP(mean): --  RR: 18 (2019 06:45) (16 - 18)  SpO2: 97% (2019 06:45) (97% - 100%)    Physical Exam:     Gen: A&Ox 3, NAD  Chest: CTAB/L  Cardiac: S1+S2+ RRR  Breast: Soft, nontender, nonengorged  Abdomen: Soft, nontender, Fundus firm below umbilicus, +BS  Gyn: Mild lochia, repaired  Extremities: Nontender, DTRS 2+, no worsening edema                          9.5    x     )-----------( x        ( 2019 06:48 )             30.8       A/P:  24y F Postpartum day #1 s/p  @ 36.4 weeks, currently in stable condition  -Continue pain management  -Encourage OOB and ambulation  -Continue post partum care  -Plan for discharge tomorrow.     d/w Dr Riddle

## 2019-06-30 NOTE — PROGRESS NOTE ADULT - ASSESSMENT
24y s/p  PPD#2, stable
A/P:  24y F Postpartum day #1 s/p  @ 36.4 weeks, currently in stable condition  -Continue pain management  -Encourage OOB and ambulation  -Continue post partum care  -Plan for discharge tomorrow.

## 2019-07-10 PROCEDURE — 87389 HIV-1 AG W/HIV-1&-2 AB AG IA: CPT

## 2019-07-10 PROCEDURE — 85730 THROMBOPLASTIN TIME PARTIAL: CPT

## 2019-07-10 PROCEDURE — 86780 TREPONEMA PALLIDUM: CPT

## 2019-07-10 PROCEDURE — 85610 PROTHROMBIN TIME: CPT

## 2019-07-10 PROCEDURE — 59050 FETAL MONITOR W/REPORT: CPT

## 2019-07-10 PROCEDURE — 86850 RBC ANTIBODY SCREEN: CPT

## 2019-07-10 PROCEDURE — G0463: CPT

## 2019-07-10 PROCEDURE — 86901 BLOOD TYPING SEROLOGIC RH(D): CPT

## 2019-07-10 PROCEDURE — 86900 BLOOD TYPING SEROLOGIC ABO: CPT

## 2019-07-10 PROCEDURE — 85027 COMPLETE CBC AUTOMATED: CPT

## 2019-07-10 PROCEDURE — 86703 HIV-1/HIV-2 1 RESULT ANTBDY: CPT

## 2019-07-10 PROCEDURE — 85018 HEMOGLOBIN: CPT

## 2019-07-10 PROCEDURE — 36415 COLL VENOUS BLD VENIPUNCTURE: CPT

## 2019-07-10 PROCEDURE — 59025 FETAL NON-STRESS TEST: CPT

## 2019-07-10 PROCEDURE — 85384 FIBRINOGEN ACTIVITY: CPT

## 2019-07-10 PROCEDURE — 88307 TISSUE EXAM BY PATHOLOGIST: CPT

## 2019-07-10 PROCEDURE — 85014 HEMATOCRIT: CPT

## 2019-09-10 ENCOUNTER — EMERGENCY (EMERGENCY)
Facility: HOSPITAL | Age: 24
LOS: 1 days | Discharge: HOME | End: 2019-09-10
Attending: EMERGENCY MEDICINE
Payer: MEDICAID

## 2019-09-10 VITALS
TEMPERATURE: 98 F | HEART RATE: 87 BPM | DIASTOLIC BLOOD PRESSURE: 68 MMHG | SYSTOLIC BLOOD PRESSURE: 105 MMHG | RESPIRATION RATE: 18 BRPM | OXYGEN SATURATION: 100 %

## 2019-09-10 VITALS
HEIGHT: 64 IN | DIASTOLIC BLOOD PRESSURE: 67 MMHG | OXYGEN SATURATION: 100 % | RESPIRATION RATE: 18 BRPM | SYSTOLIC BLOOD PRESSURE: 128 MMHG | HEART RATE: 85 BPM | WEIGHT: 223.99 LBS

## 2019-09-10 LAB
ALBUMIN SERPL ELPH-MCNC: 4.1 G/DL — SIGNIFICANT CHANGE UP (ref 3.3–5)
ALP SERPL-CCNC: 117 U/L — SIGNIFICANT CHANGE UP (ref 40–120)
ALT FLD-CCNC: 54 U/L — HIGH (ref 10–45)
ANION GAP SERPL CALC-SCNC: 12 MMOL/L — SIGNIFICANT CHANGE UP (ref 5–17)
APPEARANCE UR: ABNORMAL
AST SERPL-CCNC: 88 U/L — HIGH (ref 10–40)
BACTERIA # UR AUTO: ABNORMAL
BASE EXCESS BLDV CALC-SCNC: -2.1 MMOL/L — LOW (ref -2–2)
BASE EXCESS BLDV CALC-SCNC: 0.3 MMOL/L — SIGNIFICANT CHANGE UP (ref -2–2)
BASOPHILS # BLD AUTO: 0 K/UL — SIGNIFICANT CHANGE UP (ref 0–0.2)
BASOPHILS NFR BLD AUTO: 0.2 % — SIGNIFICANT CHANGE UP (ref 0–2)
BILIRUB SERPL-MCNC: 0.3 MG/DL — SIGNIFICANT CHANGE UP (ref 0.2–1.2)
BILIRUB UR-MCNC: NEGATIVE — SIGNIFICANT CHANGE UP
BUN SERPL-MCNC: 13 MG/DL — SIGNIFICANT CHANGE UP (ref 7–23)
CA-I SERPL-SCNC: 1.16 MMOL/L — SIGNIFICANT CHANGE UP (ref 1.12–1.3)
CA-I SERPL-SCNC: 1.24 MMOL/L — SIGNIFICANT CHANGE UP (ref 1.12–1.3)
CALCIUM SERPL-MCNC: 9.8 MG/DL — SIGNIFICANT CHANGE UP (ref 8.4–10.5)
CHLORIDE BLDV-SCNC: 104 MMOL/L — SIGNIFICANT CHANGE UP (ref 96–108)
CHLORIDE BLDV-SCNC: 108 MMOL/L — SIGNIFICANT CHANGE UP (ref 96–108)
CHLORIDE SERPL-SCNC: 99 MMOL/L — SIGNIFICANT CHANGE UP (ref 96–108)
CO2 BLDV-SCNC: 25 MMOL/L — SIGNIFICANT CHANGE UP (ref 22–30)
CO2 BLDV-SCNC: 27 MMOL/L — SIGNIFICANT CHANGE UP (ref 22–30)
CO2 SERPL-SCNC: 25 MMOL/L — SIGNIFICANT CHANGE UP (ref 22–31)
COLOR SPEC: YELLOW — SIGNIFICANT CHANGE UP
CREAT SERPL-MCNC: 0.57 MG/DL — SIGNIFICANT CHANGE UP (ref 0.5–1.3)
DIFF PNL FLD: NEGATIVE — SIGNIFICANT CHANGE UP
EOSINOPHIL # BLD AUTO: 0 K/UL — SIGNIFICANT CHANGE UP (ref 0–0.5)
EOSINOPHIL NFR BLD AUTO: 0.2 % — SIGNIFICANT CHANGE UP (ref 0–6)
EPI CELLS # UR: 11 /HPF — HIGH
GAS PNL BLDV: 137 MMOL/L — SIGNIFICANT CHANGE UP (ref 135–145)
GAS PNL BLDV: 140 MMOL/L — SIGNIFICANT CHANGE UP (ref 135–145)
GAS PNL BLDV: SIGNIFICANT CHANGE UP
GAS PNL BLDV: SIGNIFICANT CHANGE UP
GLUCOSE BLDV-MCNC: 136 MG/DL — HIGH (ref 70–99)
GLUCOSE BLDV-MCNC: 175 MG/DL — HIGH (ref 70–99)
GLUCOSE SERPL-MCNC: 141 MG/DL — HIGH (ref 70–99)
GLUCOSE UR QL: NEGATIVE — SIGNIFICANT CHANGE UP
HCG UR QL: NEGATIVE — SIGNIFICANT CHANGE UP
HCO3 BLDV-SCNC: 23 MMOL/L — SIGNIFICANT CHANGE UP (ref 21–29)
HCO3 BLDV-SCNC: 26 MMOL/L — SIGNIFICANT CHANGE UP (ref 21–29)
HCT VFR BLD CALC: 37.2 % — SIGNIFICANT CHANGE UP (ref 34.5–45)
HCT VFR BLDA CALC: 35 % — LOW (ref 39–50)
HCT VFR BLDA CALC: 44 % — SIGNIFICANT CHANGE UP (ref 39–50)
HGB BLD CALC-MCNC: 11.3 G/DL — LOW (ref 11.5–15.5)
HGB BLD CALC-MCNC: 14.3 G/DL — SIGNIFICANT CHANGE UP (ref 11.5–15.5)
HGB BLD-MCNC: 11.8 G/DL — SIGNIFICANT CHANGE UP (ref 11.5–15.5)
HYALINE CASTS # UR AUTO: 5 /LPF — HIGH (ref 0–2)
KETONES UR-MCNC: NEGATIVE — SIGNIFICANT CHANGE UP
LACTATE BLDV-MCNC: 2.2 MMOL/L — HIGH (ref 0.7–2)
LACTATE BLDV-MCNC: 2.8 MMOL/L — HIGH (ref 0.7–2)
LEUKOCYTE ESTERASE UR-ACNC: ABNORMAL
LIDOCAIN IGE QN: 32 U/L — SIGNIFICANT CHANGE UP (ref 7–60)
LYMPHOCYTES # BLD AUTO: 1.6 K/UL — SIGNIFICANT CHANGE UP (ref 1–3.3)
LYMPHOCYTES # BLD AUTO: 9.8 % — LOW (ref 13–44)
MCHC RBC-ENTMCNC: 24.1 PG — LOW (ref 27–34)
MCHC RBC-ENTMCNC: 31.7 GM/DL — LOW (ref 32–36)
MCV RBC AUTO: 75.9 FL — LOW (ref 80–100)
MONOCYTES # BLD AUTO: 0.6 K/UL — SIGNIFICANT CHANGE UP (ref 0–0.9)
MONOCYTES NFR BLD AUTO: 3.7 % — SIGNIFICANT CHANGE UP (ref 2–14)
NEUTROPHILS # BLD AUTO: 14.5 K/UL — HIGH (ref 1.8–7.4)
NEUTROPHILS NFR BLD AUTO: 86.1 % — HIGH (ref 43–77)
NITRITE UR-MCNC: NEGATIVE — SIGNIFICANT CHANGE UP
PCO2 BLDV: 45 MMHG — SIGNIFICANT CHANGE UP (ref 35–50)
PCO2 BLDV: 47 MMHG — SIGNIFICANT CHANGE UP (ref 35–50)
PH BLDV: 7.33 — LOW (ref 7.35–7.45)
PH BLDV: 7.36 — SIGNIFICANT CHANGE UP (ref 7.35–7.45)
PH UR: 6.5 — SIGNIFICANT CHANGE UP (ref 5–8)
PLATELET # BLD AUTO: 414 K/UL — HIGH (ref 150–400)
PO2 BLDV: 25 MMHG — SIGNIFICANT CHANGE UP (ref 25–45)
PO2 BLDV: 32 MMHG — SIGNIFICANT CHANGE UP (ref 25–45)
POTASSIUM BLDV-SCNC: 3.6 MMOL/L — SIGNIFICANT CHANGE UP (ref 3.5–5.3)
POTASSIUM BLDV-SCNC: 3.9 MMOL/L — SIGNIFICANT CHANGE UP (ref 3.5–5.3)
POTASSIUM SERPL-MCNC: 3.8 MMOL/L — SIGNIFICANT CHANGE UP (ref 3.5–5.3)
POTASSIUM SERPL-SCNC: 3.8 MMOL/L — SIGNIFICANT CHANGE UP (ref 3.5–5.3)
PROT SERPL-MCNC: 7.5 G/DL — SIGNIFICANT CHANGE UP (ref 6–8.3)
PROT UR-MCNC: ABNORMAL
RBC # BLD: 4.9 M/UL — SIGNIFICANT CHANGE UP (ref 3.8–5.2)
RBC # FLD: 15.3 % — HIGH (ref 10.3–14.5)
RBC CASTS # UR COMP ASSIST: 14 /HPF — HIGH (ref 0–4)
SAO2 % BLDV: 30 % — LOW (ref 67–88)
SAO2 % BLDV: 51 % — LOW (ref 67–88)
SODIUM SERPL-SCNC: 136 MMOL/L — SIGNIFICANT CHANGE UP (ref 135–145)
SP GR SPEC: 1.03 — HIGH (ref 1.01–1.02)
UROBILINOGEN FLD QL: ABNORMAL
WBC # BLD: 16.9 K/UL — HIGH (ref 3.8–10.5)
WBC # FLD AUTO: 16.9 K/UL — HIGH (ref 3.8–10.5)
WBC UR QL: 47 /HPF — HIGH (ref 0–5)

## 2019-09-10 PROCEDURE — 84295 ASSAY OF SERUM SODIUM: CPT

## 2019-09-10 PROCEDURE — 83690 ASSAY OF LIPASE: CPT

## 2019-09-10 PROCEDURE — 80053 COMPREHEN METABOLIC PANEL: CPT

## 2019-09-10 PROCEDURE — 84132 ASSAY OF SERUM POTASSIUM: CPT

## 2019-09-10 PROCEDURE — 96374 THER/PROPH/DIAG INJ IV PUSH: CPT

## 2019-09-10 PROCEDURE — 99284 EMERGENCY DEPT VISIT MOD MDM: CPT

## 2019-09-10 PROCEDURE — 82947 ASSAY GLUCOSE BLOOD QUANT: CPT

## 2019-09-10 PROCEDURE — 82330 ASSAY OF CALCIUM: CPT

## 2019-09-10 PROCEDURE — 85014 HEMATOCRIT: CPT

## 2019-09-10 PROCEDURE — 82435 ASSAY OF BLOOD CHLORIDE: CPT

## 2019-09-10 PROCEDURE — 81001 URINALYSIS AUTO W/SCOPE: CPT

## 2019-09-10 PROCEDURE — 82803 BLOOD GASES ANY COMBINATION: CPT

## 2019-09-10 PROCEDURE — 85027 COMPLETE CBC AUTOMATED: CPT

## 2019-09-10 PROCEDURE — 81025 URINE PREGNANCY TEST: CPT

## 2019-09-10 PROCEDURE — 83605 ASSAY OF LACTIC ACID: CPT

## 2019-09-10 PROCEDURE — 96375 TX/PRO/DX INJ NEW DRUG ADDON: CPT

## 2019-09-10 PROCEDURE — 87086 URINE CULTURE/COLONY COUNT: CPT

## 2019-09-10 PROCEDURE — 99284 EMERGENCY DEPT VISIT MOD MDM: CPT | Mod: 25

## 2019-09-10 RX ORDER — SODIUM CHLORIDE 9 MG/ML
1000 INJECTION INTRAMUSCULAR; INTRAVENOUS; SUBCUTANEOUS ONCE
Refills: 0 | Status: COMPLETED | OUTPATIENT
Start: 2019-09-10 | End: 2019-09-10

## 2019-09-10 RX ORDER — FAMOTIDINE 10 MG/ML
20 INJECTION INTRAVENOUS ONCE
Refills: 0 | Status: COMPLETED | OUTPATIENT
Start: 2019-09-10 | End: 2019-09-10

## 2019-09-10 RX ORDER — ONDANSETRON 8 MG/1
4 TABLET, FILM COATED ORAL ONCE
Refills: 0 | Status: COMPLETED | OUTPATIENT
Start: 2019-09-10 | End: 2019-09-10

## 2019-09-10 RX ORDER — CEPHALEXIN 500 MG
500 CAPSULE ORAL ONCE
Refills: 0 | Status: COMPLETED | OUTPATIENT
Start: 2019-09-10 | End: 2019-09-10

## 2019-09-10 RX ORDER — CEPHALEXIN 500 MG
1 CAPSULE ORAL
Qty: 10 | Refills: 0
Start: 2019-09-10 | End: 2019-09-14

## 2019-09-10 RX ADMIN — SODIUM CHLORIDE 1000 MILLILITER(S): 9 INJECTION INTRAMUSCULAR; INTRAVENOUS; SUBCUTANEOUS at 08:15

## 2019-09-10 RX ADMIN — Medication 30 MILLILITER(S): at 08:15

## 2019-09-10 RX ADMIN — ONDANSETRON 4 MILLIGRAM(S): 8 TABLET, FILM COATED ORAL at 08:15

## 2019-09-10 RX ADMIN — FAMOTIDINE 20 MILLIGRAM(S): 10 INJECTION INTRAVENOUS at 08:15

## 2019-09-10 RX ADMIN — Medication 500 MILLIGRAM(S): at 11:44

## 2019-09-10 NOTE — ED PROVIDER NOTE - PHYSICAL EXAMINATION
Gen: NAD, non-toxic, conversational  Eyes: PERRLA, EOMI   HENT: Normocephalic, atraumatic. External ears normal, no rhinorrhea, moist mucous membranes.   CV: RRR, no M/R/G  Resp: CTAB, non-labored, speaking without difficulty on room air  Abd: soft, +tenderness to palp epigastrium, non rigid, no guarding or rebound tenderness  Back: No CVAT bilaterally, no midline ttp  Skin: dry, wwp   Neuro: AOx3, speech is fluent and appropriate  Psych: Mood concerned, affect euthymic

## 2019-09-10 NOTE — ED PROVIDER NOTE - OBJECTIVE STATEMENT
Hx of GERD in pregnancy presenting 2-3 months s/p pregnancy with complaint of epigastric abdominal pain and vomiting x "many times" this morning since 3 am, started after eating a meal, did not resolve with vomiting so came here for further evaluation. Denies fevers, back pain, chest pain. States no chronic medications, no medical problems, no allergies to medicines.

## 2019-09-10 NOTE — ED PROVIDER NOTE - CLINICAL SUMMARY MEDICAL DECISION MAKING FREE TEXT BOX
24F hx of reflux in pregnancy present 2-3 months s/p giving birth, now with complaints of abdominal pain and vomiting that started 5 hrs pta, on exam with mild ttp over epigastrium o/w well appearing, will check labs, treat with gi cocktail, give ivf, reassess, dispo pending clinical course.

## 2019-09-10 NOTE — ED PROVIDER NOTE - NSFOLLOWUPCLINICS_GEN_ALL_ED_FT
[facility] General Internal Medicine  General Internal Medicine  [address] [id number]  Phone: [phone]  Fax:   Follow Up Time: 1-3 Days

## 2019-09-10 NOTE — ED ADULT NURSE NOTE - OBJECTIVE STATEMENT
25 y/o female PMH GERD presents to ED reporting abdominal pain since yesterday night. Pt s/p caesarean section approximately 2 months ago. Pt also reports n/v. On exam, AOx3, speaking in complete sentences. Lung sounds CTA, NAD. Abdomen soft, tender epigastric area, non-distended, normoactive bowel sounds. Pt denies fever/chills, CP, SOB at this time. Awaiting evaluation by MD at this time.

## 2019-09-10 NOTE — ED PROVIDER NOTE - PATIENT PORTAL LINK FT
You can access the FollowMyHealth Patient Portal offered by [facility] by registering at the following website: [email or web address]. By joining [facility]’s FollowMyHealth portal, you will also be able to view your health information using other applications (apps) compatible with our system.

## 2019-09-10 NOTE — ED PROVIDER NOTE - CARE PLAN
Principal Discharge DX:	Vomiting Principal Discharge DX:	UTI (urinary tract infection)  Secondary Diagnosis:	Vomiting

## 2019-09-10 NOTE — ED PROVIDER NOTE - ATTENDING CONTRIBUTION TO CARE
23y/o F with h/o GERD in last pregnancy (3 mo/ago s/p delivery) presenting with upper abdominal pain a/w nausea and multiple episodes of NB/NB vomiting. No fever/chills, normal BM, no rash, no lower abdominal pain.  Pain located in middle of upper abdomen (indicates epigastrium) and occasionally radiates to the left upper abdomen; no right sided abdominal pain, no radiation to back/shoulder.      On Physical Exam:  General: well appearing, in NAD, speaking clearly in full sentences and without difficulty; cooperative with exam  HEENT: PERRL, MMM  Neck: no neck tenderness, no nuchal rigidity  Cardiac: normal s1, s2; RRR; no MGR  Lungs: CTABL  Abdomen: soft nondistended and with minimal epigastric tenderness without rebound or guarding.  No RUQ tenderness, negative rosa's  Back: no midline spinal tenderness; no CVA tenderness  : no bladder tenderness or distension  Skin: intact, no rash  Extremities: no peripheral edema, no gross deformities  Neuro: no gross neurologic deficits     AP: Abd pain, epigastric; abd exam noted to have minimal epigastric tenderness; no RUQ tenderness.  Likely gastritis/gerd/pud; also consider pancreatitis in differential; given recent delivery check HCG status (if positive will need further evaluation for new pregnancy or retained products - though very unlikely) and will check UA to eval for UTI (less likely).  Check cbc (eval for anemia), CMP (to evaluate for electrolyte abnormalities or renal/liver dysfunction), lipase (eval for pancreatitis.  Give pepcid/maalox; reassess.    ED Course: patient improved with GI medications in ED.  Leukocytosis noted, but afebrile, well appearing and overall presentation is not c/w sepsis.  Repeat exam shows no abdominal tenderness (improved from minimal tenderness).  Patient tolerating po. Lipase WNL.  CMP noncontributory.  UCG Neg.  UA ? infection; will treat for UTI; vomiting may reflect early pyelonephritis however much less likely given lack of other symptoms or signs on exam such as CVA tenderness.  Is well appearing and tolerating po, candidate for outpatient therapy.  Will dc on antibiotics, instruct to f/u with ob-gyn and give strict return precautions.  Results of ED evaluation including labs d/w patient, who verbalizes understanding of ED evaluation and discharge plan including medications, follow-up instructions and return precautions. 25y/o F with h/o GERD in last pregnancy (3 mo/ago s/p delivery) presenting with upper abdominal pain a/w nausea and multiple episodes of NB/NB vomiting. No fever/chills, normal BM, no rash, no lower abdominal pain.  Pain located in middle of upper abdomen (indicates epigastrium) and occasionally radiates to the left upper abdomen; no right sided abdominal pain, no radiation to back/shoulder.      On Physical Exam:  General: well appearing, in NAD, speaking clearly in full sentences and without difficulty; cooperative with exam  HEENT: PERRL, MMM  Neck: no neck tenderness, no nuchal rigidity  Cardiac: normal s1, s2; RRR; no MGR  Lungs: CTABL  Abdomen: soft nondistended and with minimal epigastric tenderness without rebound or guarding.  No RUQ tenderness, negative rosa's  Back: no midline spinal tenderness; no CVA tenderness  : no bladder tenderness or distension  Skin: intact, no rash  Extremities: no peripheral edema, no gross deformities  Neuro: no gross neurologic deficits     AP: Abd pain, epigastric; abd exam noted to have minimal epigastric tenderness; no RUQ tenderness.  Likely gastritis/gerd/pud; also consider pancreatitis in differential; given recent delivery check HCG status (if positive will need further evaluation for new pregnancy or retained products - though very unlikely) and will check UA to eval for UTI (less likely).  Check cbc (eval for anemia), CMP (to evaluate for electrolyte abnormalities or renal/liver dysfunction), lipase (eval for pancreatitis.  Give pepcid/maalox; reassess.    ED Course: patient improved with GI medications in ED.  Leukocytosis noted, but afebrile, well appearing and overall presentation is not c/w sepsis.  Repeat exam shows no abdominal tenderness (improved from minimal tenderness).  Patient tolerating po. Lipase WNL.  CMP noncontributory.  UCG Neg.  UA ? infection; will treat for UTI; vomiting may reflect early pyelonephritis however much less likely given lack of other symptoms or signs on exam such as CVA tenderness.  Is well appearing and tolerating po, candidate for outpatient therapy.  Mildly elevated lactate noted, repeated and is downtrending; patient not septic and presentation not c/w mesenteric ischemia or other acute abdominal pathology.  Will dc on antibiotics, instruct to f/u with ob-gyn and give strict return precautions.  Results of ED evaluation including labs d/w patient, who verbalizes understanding of ED evaluation and discharge plan including medications, follow-up instructions and return precautions.

## 2019-09-10 NOTE — ED PROVIDER NOTE - NSFOLLOWUPINSTRUCTIONS_ED_ALL_ED_FT
Urinary Tract Infection    A urinary tract infection (UTI) is an infection of any part of the urinary tract, which includes the kidneys, ureters, bladder, and urethra. Risk factors include ignoring your need to urinate, wiping back to front if female, being an uncircumcised male, and having diabetes or a weak immune system. Symptoms include frequent urination, pain or burning with urination, foul smelling urine, cloudy urine, pain in the lower abdomen, blood in the urine, and fever. If you were prescribed an antibiotic medicine, take it as told by your health care provider. Do not stop taking the antibiotic even if you start to feel better.    SEEK IMMEDIATE MEDICAL CARE IF YOU HAVE ANY OF THE FOLLOWING SYMPTOMS: severe back or abdominal pain, fever, inability to keep fluids or medicine down, dizziness/lightheadedness, or a change in mental status.      Nausea / Vomiting    Nausea is the feeling that you have to vomit. As nausea gets worse, it can lead to vomiting. Vomiting puts you at an increased risk for dehydration. Older adults and people with other diseases or a weak immune system are at higher risk for dehydration. Drink clear fluids in small but frequent amounts as tolerated. Eat bland, easy-to-digest foods in small amounts as tolerated.    SEEK IMMEDIATE MEDICAL CARE IF YOU HAVE ANY OF THE FOLLOWING SYMPTOMS: fever, inability to keep sufficient fluids down, black or bloody vomitus, black or bloody stools, lightheadedness/dizziness, chest pain, severe headache, rash, shortness of breath, cold or clammy skin, confusion, pain with urination, or any signs of dehydration.

## 2019-09-11 LAB
CULTURE RESULTS: SIGNIFICANT CHANGE UP
SPECIMEN SOURCE: SIGNIFICANT CHANGE UP

## 2019-09-12 NOTE — ED POST DISCHARGE NOTE - DETAILS
9/12/19: attempted to contact pt regarding ucx contamination. Left message w/ relative to have pt call admin line for results. - Juvenal Rodriguez PA-C 9/13/19: spoke to  and instructed pt to call back admin line regarding test results. will relay message -Danica Zafar PA-C 9/13/19: patient returned call and instructed to follow up with PMD to leave additional urine sample patient understood -Danica Zafar PA-C

## 2019-11-11 NOTE — PATIENT PROFILE OB - SURGICAL SITE INCISION
Patient Education   Guaifenesin Oral tablet, biphasic release  What is this medicine?  GUAIFENESIN (cesar FEN e sin) is an expectorant. It helps to thin mucous and make coughs more productive. This medicine is used to treat coughs caused by colds or the flu. It is not intended to treat chronic cough caused by smoking, asthma, emphysema, or heart failure.  This medicine may be used for other purposes; ask your health care provider or pharmacist if you have questions.  What should I tell my health care provider before I take this medicine?  They need to know if you have any of these conditions:  · fever  · kidney disease  · an unusual or allergic reaction to guaifenesin, other medicines, foods, dyes, or preservatives  · pregnant or trying to get pregnant  · breast-feeding  How should I use this medicine?  Take this medicine by mouth with a full glass of water. Follow the directions on the prescription label. Do not break, chew or crush this medicine. You may take with food or on an empty stomach. Take your medicine at regular intervals. Do not take your medicine more often than directed.  Talk to your pediatrician regarding the use of this medicine in children. While this drug may be prescribed for children as young as 12 years old for selected conditions, precautions do apply.  Overdosage: If you think you have taken too much of this medicine contact a poison control center or emergency room at once.  NOTE: This medicine is only for you. Do not share this medicine with others.  What if I miss a dose?  If you miss a dose, take it as soon as you can. If it is almost time for your next dose, take only that dose. Do not take double or extra doses.  What may interact with this medicine?  Interactions are not expected.  This list may not describe all possible interactions. Give your health care provider a list of all the medicines, herbs, non-prescription drugs, or dietary supplements you use. Also tell them if you smoke,  drink alcohol, or use illegal drugs. Some items may interact with your medicine.  What should I watch for while using this medicine?  Do not treat a cough for more than 1 week without consulting your doctor or health care professional. If you also have a high fever, skin rash, continuing headache, or sore throat, see your doctor.  For best results, drink 6 to 8 glasses water daily while you are taking this medicine.  What side effects may I notice from receiving this medicine?  Side effects that you should report to your doctor or health care professional as soon as possible:  · allergic reactions like skin rash, itching or hives, swelling of the face, lips, or tongue  Side effects that usually do not require medical attention (report to your doctor or health care professional if they continue or are bothersome):  · dizziness  · headache  · stomach upset  This list may not describe all possible side effects. Call your doctor for medical advice about side effects. You may report side effects to FDA at 6-507-FDA-4172.  Where should I keep my medicine?  Keep out of the reach of children.  Store at room temperature between 20 and 25 degrees C (68 and 77 degrees F). Keep container tightly closed. Throw away any unused medicine after the expiration date.  NOTE:This sheet is a summary. It may not cover all possible information. If you have questions about this medicine, talk to your doctor, pharmacist, or health care provider. Copyright© 2016 [employer]      AFRIN twice a day for up to three days. This is for nasal congestion.               no

## 2020-07-28 ENCOUNTER — RESULT REVIEW (OUTPATIENT)
Age: 25
End: 2020-07-28

## 2020-11-06 PROBLEM — Z00.00 ENCOUNTER FOR PREVENTIVE HEALTH EXAMINATION: Status: ACTIVE | Noted: 2020-11-06

## 2020-11-10 ENCOUNTER — OUTPATIENT (OUTPATIENT)
Dept: OUTPATIENT SERVICES | Age: 25
LOS: 1 days | Discharge: HOME | End: 2020-11-10

## 2020-11-11 ENCOUNTER — APPOINTMENT (OUTPATIENT)
Dept: PEDIATRIC CARDIOLOGY | Facility: CLINIC | Age: 25
End: 2020-11-11
Payer: MEDICAID

## 2020-11-11 PROCEDURE — 76825 ECHO EXAM OF FETAL HEART: CPT

## 2020-11-11 PROCEDURE — 76827 ECHO EXAM OF FETAL HEART: CPT | Mod: 59

## 2020-11-11 PROCEDURE — 99203 OFFICE O/P NEW LOW 30 MIN: CPT | Mod: 25

## 2020-11-11 PROCEDURE — 99072 ADDL SUPL MATRL&STAF TM PHE: CPT

## 2020-11-11 PROCEDURE — 76820 UMBILICAL ARTERY ECHO: CPT

## 2020-11-11 PROCEDURE — 93325 DOPPLER ECHO COLOR FLOW MAPG: CPT | Mod: 59

## 2020-12-11 ENCOUNTER — APPOINTMENT (OUTPATIENT)
Dept: PEDIATRIC CARDIOLOGY | Facility: CLINIC | Age: 25
End: 2020-12-11
Payer: MEDICAID

## 2020-12-11 PROCEDURE — 99072 ADDL SUPL MATRL&STAF TM PHE: CPT

## 2020-12-11 PROCEDURE — 76820 UMBILICAL ARTERY ECHO: CPT

## 2020-12-11 PROCEDURE — 76828 ECHO EXAM OF FETAL HEART: CPT

## 2020-12-11 PROCEDURE — 99213 OFFICE O/P EST LOW 20 MIN: CPT | Mod: 25

## 2020-12-11 PROCEDURE — 76820 UMBILICAL ARTERY ECHO: CPT | Mod: 59

## 2020-12-11 PROCEDURE — 76826 ECHO EXAM OF FETAL HEART: CPT

## 2020-12-11 PROCEDURE — 76828 ECHO EXAM OF FETAL HEART: CPT | Mod: 59

## 2020-12-11 PROCEDURE — 76826 ECHO EXAM OF FETAL HEART: CPT | Mod: 59

## 2020-12-11 PROCEDURE — 93325 DOPPLER ECHO COLOR FLOW MAPG: CPT | Mod: 59

## 2022-05-31 NOTE — ED ADULT TRIAGE NOTE - BP NONINVASIVE DIASTOLIC (MM HG)
Show Applicator Variable?: Yes Consent: The patient's consent was obtained including but not limited to risks of crusting, scabbing, blistering, scarring, darker or lighter pigmentary change, recurrence, incomplete removal and infection. Render Note In Bullet Format When Appropriate: No Duration Of Freeze Thaw-Cycle (Seconds): 0 Post-Care Instructions: I reviewed with the patient in detail post-care instructions. Patient is to wear sunprotection, and avoid picking at any of the treated lesions. Pt may apply Vaseline to crusted or scabbing areas. Detail Level: Detailed 85

## 2023-05-19 NOTE — ED PROVIDER NOTE - PRINCIPAL DIAGNOSIS
May 23, 2023       Scott Akers MD  [address]  [facility] [address]  Via Fax: [phone]      Patient: Merrill Levy   YOB: 2007   Date of Visit: 5/19/2023       Dear Dr. Akers:    Thank you for referring Merrill Levy to me for evaluation. Below are my notes for this visit with him.    If you have questions, please do not hesitate to call me. I look forward to following your patient along with you.      Sincerely,        Timmy Llanos MD        CC: No Recipients    Timmy Llanos MD  5/23/2023  1:26 PM  Signed  Pediatric Cardiology Consultation    Referring Physician:  Scott Akers MD    SUBJECTIVE:  Merrill is a 15 year old male presenting with a chief complaint of palpitations and near syncope.   Symptoms started about 1 month ago.  On the day it began, he was at a car wash with his brother waiting for their car to get finished.  While on the way home he started feeling a little bit \"shaky\".  Otherwise he felt well.  Earlier in the day he felt well with no symptoms.  After getting home he felt like his hands, then his legs were getting weak.  He thought maybe he had low blood sugar so ate some chocolate, but the symptoms did not improve.  The shaking became progressively worse and he began feeling lightheaded and dizzy like he was going to pass out.  His mother called an ambulance and he was taken to the emergency room for further work-up.  His mother reports him visibly shaking and appearing pale.  He was alert and oriented throughout the entire episode.  His heart was beating faster than usual but he denies any chest pain or shortness of breath.  While in the emergency room, an ECG performed while he was still symptomatic was normal.  Eventually, his symptoms resolved.  Laboratory work-up including COVID/flu/RSV panel, drug screen, urinalysis, cyst, lipase lipase, CMP, and CBC were on room and CBC were unremarkable.  Chest x-ray was normal.    In  the last few weeks the last few weeks he has had several similar but less severe episodes.    He otherwise has been healthy with normal growth and development.  He denies any other cardiovascular symptoms.  He has no exertional symptoms.    Multiple family members including grandparents and uncles have had myocardial infarctions at a young age, likely lifestyle related.    Past Medical History:   Diagnosis Date   • No known problems        There is no problem list on file for this patient.       Past Surgical History:   Procedure Laterality Date   • No past surgeries         Social History     Social History Narrative   • Not on file       Family History     Relation Problem Comments    Father Patient is unaware of any medical problems        Mother Patient is unaware of any medical problems              Patient's Medications   New Prescriptions    No medications on file   Previous Medications    ASCORBIC ACID (VITAMIN C PO)        PEDIATRIC MULTIVITAMINS-IRON (FLINTSTONES COMPLETE PO)        POLYMYXIN B-TRIMETHOPRIM (POLYTRIM) 58112-3.1 UNIT/ML-% OPHTHALMIC SOLUTION    2 drops in affected eye 4 times daily for 7 days   Modified Medications    No medications on file   Discontinued Medications    No medications on file        ALLERGIES:  Patient has no known allergies.    OBJECTIVE  Height: 5' 5.55\" (1.665 m)  Weight: 69.5 kg (153 lb 3.5 oz)  Vitals:    05/19/23 1512   BP: (!) 128/58   BP Location: RUE - Right upper extremity   Patient Position: Sitting   Cuff Size: Large Adult   Pulse: 69   Temp: 98.6 °F (37 °C)   TempSrc: Temporal   SpO2: 100%   Weight: 69.5 kg (153 lb 3.5 oz)   Height: 5' 5.55\" (1.665 m)   PainSc:  0        Physical Exam  Constitutional:       Appearance: He is well-developed.   HENT:      Head: Normocephalic and atraumatic.   Eyes:      Conjunctiva/sclera: Conjunctivae normal.   Neck:      Vascular: No JVD.   Cardiovascular:      Rate and Rhythm: Normal rate and regular rhythm.      Pulses:            Radial pulses are 2+ on the right side.        Femoral pulses are 2+ on the right side.     Heart sounds: S1 normal and S2 normal. No murmur heard.     No friction rub. No gallop. No S3 or S4 sounds.   Pulmonary:      Effort: Pulmonary effort is normal.      Breath sounds: Normal breath sounds.   Abdominal:      General: Bowel sounds are normal. There is no distension.      Palpations: Abdomen is soft.      Tenderness: There is no abdominal tenderness.   Musculoskeletal:         General: No deformity. Normal range of motion.      Cervical back: Normal range of motion and neck supple.   Skin:     General: Skin is warm and dry.      Findings: No rash.   Neurological:      Mental Status: He is alert and oriented to person, place, and time.         Previous diagnostic testing:  Encounter Date: 05/03/23   Electrocardiogram 12-Lead   Result Value    Ventricular Rate EKG/Min (BPM) 91    Atrial Rate (BPM) 91    DC-Interval (MSEC) 186    QRS-Interval (MSEC) 94    QT-Interval (MSEC) 336    QTc 413    P Axis (Degrees) 63    R Axis (Degrees) 46    T Axis (Degrees) 57    REPORT TEXT      Normal sinus rhythm  with sinus arrhythmia  Nonspecific ST and T wave abnormality  Borderline ECG  Confirmed by DASH REBOLLEDO MD ([id number]) on 5/4/2023 10:05:13 PM           Diagnostic testing this encounter 05/19/2023:  ECHOCARDIOGRAM:  No cardiac abnormalities demonstrated.  Normal intracardiac connections with no apparent septal defects.    Normal valve structure and function.    Normal biventricular size, wall thickness, and systolic function.  No aortic arch abnormalities demonstrated.  No evidence of pulmonary hypertension.  No pericardial or obvious pleural effusion.        No prior echocardiogram for comparison.        ASSESSMENT:  Merrill is a 15-year-old male who had 1 episode of near syncope with shaking of his hands and legs.  It is likely that the episode was vasovagal in nature and not related to an underlying structural or  functional cardiac abnormality.  EKG and echocardiogram are reassuring.    RECOMMENDATIONS:  Discussed with His the mechanism of syncope, and asked her to become aware of His symptoms before an event.  As soon as He starts feeling that an episode is about to occur, He should lie flat and elevate her legs (hopefully to prevent an episode of loss of consciousness).   Increase overall fluid intake to at least 60-80 ounces of water per day and avoid caffeine.  He May have salty snacks throughout the day and drink electrolyte rich fluids prior to exercise.  No restrictions from a cardiology standpoint.  He is cleared to participate in all activities including competitive sports.  He is at no greater risk than the general participation for surgery, sedation, or anesthesia.  No further outpatient cardiology follow up or testing is planed unless His symptoms do not improve.      Merrill and his mother expressed understanding and agreement with the above recommendations.  All questions were answered in detail.  I spent 40 minutes in this encounter, including chart review, counseling and documentation.    Timmy Llanos MD  Pediatric Cardiologist.  [facility]     Gastroesophageal reflux disease without esophagitis

## 2023-11-16 DIAGNOSIS — Z01.818 ENCOUNTER FOR OTHER PREPROCEDURAL EXAMINATION: ICD-10-CM

## 2023-11-16 DIAGNOSIS — E66.9 OBESITY, UNSPECIFIED: ICD-10-CM

## 2023-11-17 ENCOUNTER — TRANSCRIPTION ENCOUNTER (OUTPATIENT)
Age: 28
End: 2023-11-17

## 2023-11-17 ENCOUNTER — APPOINTMENT (OUTPATIENT)
Dept: SURGERY | Facility: CLINIC | Age: 28
End: 2023-11-17
Payer: COMMERCIAL

## 2023-11-17 VITALS
TEMPERATURE: 96.6 F | SYSTOLIC BLOOD PRESSURE: 121 MMHG | HEART RATE: 101 BPM | BODY MASS INDEX: 45.41 KG/M2 | RESPIRATION RATE: 17 BRPM | WEIGHT: 266 LBS | OXYGEN SATURATION: 99 % | HEIGHT: 64 IN | DIASTOLIC BLOOD PRESSURE: 79 MMHG

## 2023-11-17 DIAGNOSIS — Z78.9 OTHER SPECIFIED HEALTH STATUS: ICD-10-CM

## 2023-11-17 DIAGNOSIS — Z86.39 PERSONAL HISTORY OF OTHER ENDOCRINE, NUTRITIONAL AND METABOLIC DISEASE: ICD-10-CM

## 2023-11-17 DIAGNOSIS — Z82.49 FAMILY HISTORY OF ISCHEMIC HEART DISEASE AND OTHER DISEASES OF THE CIRCULATORY SYSTEM: ICD-10-CM

## 2023-11-17 PROCEDURE — 99205 OFFICE O/P NEW HI 60 MIN: CPT

## 2023-12-01 ENCOUNTER — APPOINTMENT (OUTPATIENT)
Dept: SURGERY | Facility: CLINIC | Age: 28
End: 2023-12-01
Payer: COMMERCIAL

## 2023-12-01 PROCEDURE — 97802 MEDICAL NUTRITION INDIV IN: CPT

## 2023-12-04 ENCOUNTER — APPOINTMENT (OUTPATIENT)
Dept: PSYCHIATRY | Facility: CLINIC | Age: 28
End: 2023-12-04
Payer: COMMERCIAL

## 2023-12-04 PROCEDURE — 90791 PSYCH DIAGNOSTIC EVALUATION: CPT | Mod: GT

## 2023-12-06 NOTE — ED ADULT TRIAGE NOTE - AS HEIGHT TYPE
Ubrogepant 100 MG Tab      PA Renewal    Initiated pa in cmm Key: [id number]    Per CMM:  Available without authorization    Called [employer] provider service at [phone] to see if PA renewal is needed. Was transferred to [facility] at [phone].    Spoke to Lul stated it needs a PA renewal and transferring me to the PA department at [phone]. spoke to Connie stated we can do a PA via phone. answered clinical questions via phone. need clinical notes attached to PA and fax in. PA ref# [id number]    faxed clinical notes to [facility]       Prior Authorization for Ubrelvy has been approved for a quantity of 16 , day supply 30    Prior Authorization reference number: [id number]  Insurance: [employer]  Effective dates: 12/06/2023 - 12/05/2024  Copay: $1,577.14     Is patient eligible to fill with [facility]? Yes    Next Steps: The patient's copay exceeds $5.00. Proceed with contacting patient to offer financial assistance.    stated

## 2023-12-07 ENCOUNTER — APPOINTMENT (OUTPATIENT)
Dept: CARDIOLOGY | Facility: CLINIC | Age: 28
End: 2023-12-07

## 2023-12-15 ENCOUNTER — APPOINTMENT (OUTPATIENT)
Dept: CARDIOLOGY | Facility: CLINIC | Age: 28
End: 2023-12-15

## 2023-12-18 ENCOUNTER — APPOINTMENT (OUTPATIENT)
Dept: SURGERY | Facility: CLINIC | Age: 28
End: 2023-12-18
Payer: COMMERCIAL

## 2023-12-18 VITALS — WEIGHT: 263 LBS | BODY MASS INDEX: 45.14 KG/M2

## 2023-12-18 PROCEDURE — 99213 OFFICE O/P EST LOW 20 MIN: CPT | Mod: 95

## 2023-12-18 NOTE — PHYSICAL EXAM
[Obese, well nourished, in no acute distress] : obese, well nourished, in no acute distress [Normal] : affect appropriate [de-identified] : normal respirations

## 2023-12-18 NOTE — ASSESSMENT
[FreeTextEntry1] : 28-year-old female longstanding history of morbid obesity (BMI 45) laparoscopic sleeve gastrectomy.

## 2023-12-18 NOTE — PLAN
[FreeTextEntry1] : Discussed pre- and post-operative nutritional recommendations. Reinforced healthy food choices with a focus on fresh whole foods, daily activity and mindful eating. All questions were answered.   The patient expressed understanding of all behavioral and nutritional recommendations and agrees to practice them with the understanding that success with these behaviors will be assessed at future visits.     Advised to practice eating more slowly, chewing more thoroughly. Walk more and incorporate movement into daily life.  F/u 1 month

## 2023-12-18 NOTE — HISTORY OF PRESENT ILLNESS
[de-identified] : Patient is a 28-year-old female who presents for first weigh-in after initial bariatric surgery consultation on November 19. No changes in medical history reported.  Continuing to moderate portion sizes and make more healthful choices.  Increasing activity levels as much as possible. Completed PFTs. Awaiting sleep study. Cut out soda.

## 2023-12-19 ENCOUNTER — APPOINTMENT (OUTPATIENT)
Dept: PSYCHIATRY | Facility: CLINIC | Age: 28
End: 2023-12-19
Payer: COMMERCIAL

## 2023-12-19 PROCEDURE — 90832 PSYTX W PT 30 MINUTES: CPT | Mod: GT

## 2023-12-19 NOTE — DISCUSSION/SUMMARY
[Conventional grooming and hygiene] : Conventional grooming and hygiene [Calm, cooperative] : Calm, cooperative [No unusual behaviors, movements, etc.] : No unusual behaviors, movements, etc. [Normal Rate/Tone/Volume] : Normal Rate/Tone/Volume [Normal Range] : Normal Range [Euthymic] : Euthymic [Logical, Goal Directed] : Logical, Goal Directed [None] : None [No delusions or hallucinations] : No delusions or hallucinations [Oriented to person, place, time] : Oriented to person, place, time [Intact/WNL] : Intact/WNL [Adequate/WNL] : Adequate/WNL [Strategies to improve adherence identified] : Strategies to improve adherence identified [Addtnl Health & Behavior Intervention: Individual] : Additional Health and Behavior Intervention: Individual [de-identified] : fair [de-identified] : improving [de-identified] : emotional eating, cravings, emotional distress [de-identified] : Receptive to feedback, family support [FreeTextEntry1] : Pt was seen for follow up after bariatric surgery to support recommendation of psychotherapy prior to bariatric surgery. Session provided assistance in using resources and introduced skill of riding out emotions and delaying with distraction to assist with cravings and emotional eating.  [de-identified] : UFED-- unspecified feeding and eating disorder. [FreeTextEntry3] : Individual psychotherapy and using skills to manage cravings and emotional eating.  [FreeTextEntry5] : compliance with behavioral and dietary recommendations

## 2023-12-19 NOTE — REASON FOR VISIT
[Home] : at home, [unfilled] , at the time of the visit. [Other Location: e.g. Home (Enter Location, City,State)___] : at [unfilled] [Patient] : the patient [Follow-Up Visit] : a follow-up visit for [Morbid Obesity (BMI>40)] : morbid obesity (bmi>40)

## 2023-12-22 ENCOUNTER — NON-APPOINTMENT (OUTPATIENT)
Age: 28
End: 2023-12-22

## 2023-12-22 ENCOUNTER — APPOINTMENT (OUTPATIENT)
Dept: CARDIOLOGY | Facility: CLINIC | Age: 28
End: 2023-12-22
Payer: COMMERCIAL

## 2023-12-22 VITALS
WEIGHT: 263 LBS | BODY MASS INDEX: 45.14 KG/M2 | OXYGEN SATURATION: 100 % | SYSTOLIC BLOOD PRESSURE: 120 MMHG | DIASTOLIC BLOOD PRESSURE: 78 MMHG | HEART RATE: 92 BPM

## 2023-12-22 PROCEDURE — 99205 OFFICE O/P NEW HI 60 MIN: CPT | Mod: 25

## 2023-12-22 PROCEDURE — 93000 ELECTROCARDIOGRAM COMPLETE: CPT

## 2024-01-12 ENCOUNTER — APPOINTMENT (OUTPATIENT)
Dept: ULTRASOUND IMAGING | Facility: HOSPITAL | Age: 29
End: 2024-01-12

## 2024-01-12 ENCOUNTER — APPOINTMENT (OUTPATIENT)
Dept: RADIOLOGY | Facility: HOSPITAL | Age: 29
End: 2024-01-12

## 2024-01-12 ENCOUNTER — OUTPATIENT (OUTPATIENT)
Dept: OUTPATIENT SERVICES | Facility: HOSPITAL | Age: 29
LOS: 1 days | End: 2024-01-12
Payer: COMMERCIAL

## 2024-01-12 DIAGNOSIS — E66.9 OBESITY, UNSPECIFIED: ICD-10-CM

## 2024-01-12 DIAGNOSIS — Z01.818 ENCOUNTER FOR OTHER PREPROCEDURAL EXAMINATION: ICD-10-CM

## 2024-01-12 DIAGNOSIS — Z00.00 ENCOUNTER FOR GENERAL ADULT MEDICAL EXAMINATION WITHOUT ABNORMAL FINDINGS: ICD-10-CM

## 2024-01-12 PROCEDURE — 76700 US EXAM ABDOM COMPLETE: CPT

## 2024-01-12 PROCEDURE — 74240 X-RAY XM UPR GI TRC 1CNTRST: CPT

## 2024-01-12 PROCEDURE — 76700 US EXAM ABDOM COMPLETE: CPT | Mod: 26

## 2024-01-12 PROCEDURE — 74240 X-RAY XM UPR GI TRC 1CNTRST: CPT | Mod: 26

## 2024-01-16 ENCOUNTER — APPOINTMENT (OUTPATIENT)
Dept: PSYCHIATRY | Facility: CLINIC | Age: 29
End: 2024-01-16
Payer: COMMERCIAL

## 2024-01-16 PROCEDURE — 90834 PSYTX W PT 45 MINUTES: CPT | Mod: GT

## 2024-01-16 NOTE — DISCUSSION/SUMMARY
[Conventional grooming and hygiene] : Conventional grooming and hygiene [No unusual behaviors, movements, etc.] : No unusual behaviors, movements, etc. [Normal Rate/Tone/Volume] : Normal Rate/Tone/Volume [Normal Range] : Normal Range [Logical, Goal Directed] : Logical, Goal Directed [None] : None [No delusions or hallucinations] : No delusions or hallucinations [Intact/WNL] : Intact/WNL [Oriented to person, place, time] : Oriented to person, place, time [Adequate/WNL] : Adequate/WNL [Identify, practice refine strategies to promote adherence to regimen] : Identify, practice refine strategies to promote adherence to regimen [Coping skills training to overcome social/emotional barriers to adherence] : Coping skills training to overcome social/emotional barriers to adherence [FreeTextEntry2] : forthcoming, well related [FreeTextEntry6] : anxious, mildly dysphoric [de-identified] : fair [de-identified] : improving [de-identified] : PTSD, emotional eating [de-identified] : Improving [FreeTextEntry1] : Pt was seen for continued follow-up. She reported improvement in ability to resist cravings, and effective utilization of therapy skills. She has not yet found a therapist though reported continued intention of doing so. Keeping a food diary was assigned to help the pt self-monitor food intake. Next appt scheduled in 3 weeks.  [de-identified] : UFED- Unspecified Feeding or Eating Disorder PTSD Persistent Depressive Disorder, Postpartum Onset [FreeTextEntry5] : compliance with behavioral and dietary recommendations

## 2024-01-23 ENCOUNTER — APPOINTMENT (OUTPATIENT)
Dept: SURGERY | Facility: CLINIC | Age: 29
End: 2024-01-23
Payer: COMMERCIAL

## 2024-01-23 PROCEDURE — 97802 MEDICAL NUTRITION INDIV IN: CPT

## 2024-01-25 ENCOUNTER — APPOINTMENT (OUTPATIENT)
Dept: CARDIOLOGY | Facility: CLINIC | Age: 29
End: 2024-01-25
Payer: COMMERCIAL

## 2024-01-25 PROCEDURE — 93306 TTE W/DOPPLER COMPLETE: CPT

## 2024-01-25 PROCEDURE — 93015 CV STRESS TEST SUPVJ I&R: CPT

## 2024-01-26 ENCOUNTER — APPOINTMENT (OUTPATIENT)
Dept: SURGERY | Facility: CLINIC | Age: 29
End: 2024-01-26
Payer: COMMERCIAL

## 2024-01-26 VITALS — BODY MASS INDEX: 45.32 KG/M2 | WEIGHT: 264 LBS

## 2024-01-26 PROCEDURE — 99213 OFFICE O/P EST LOW 20 MIN: CPT | Mod: 95

## 2024-01-26 NOTE — HISTORY OF PRESENT ILLNESS
[Home] : at home, [unfilled] , at the time of the visit. [Medical Office: ([facility])___] : at the medical office located in  [Verbal consent obtained from patient] : the patient, [unfilled] [de-identified] : Patient is a 28-year-old female who presents for second weigh-in after initial bariatric surgery consultation on November 19. No changes in medical history reported.  Continuing to moderate portion sizes and make more healthful choices.  Increasing activity levels as much as possible.  Met with nutritionist and has plans for cutting portion sizes.

## 2024-01-26 NOTE — ASSESSMENT
[FreeTextEntry1] : 29-year-old female with longstanding history of morbid obesity (BMI 45), presenting for continuing evaluation in preparation for planned laparoscopic sleeve gastrectomy.  Upper GI series demonstrated some gastroesophageal reflux but otherwise unremarkable. Abdominal ultrasound showed mild hepatic steatosis and cholelithiasis without cholecystitis.

## 2024-01-26 NOTE — PHYSICAL EXAM
[Obese, well nourished, in no acute distress] : obese, well nourished, in no acute distress [Normal] : affect appropriate [de-identified] : normal respirations

## 2024-01-26 NOTE — PLAN
[FreeTextEntry1] : Encouraged to eat more whole foods, read food labels and reduce intake of processed foods. Advised to eat more slowly, chew more thoroughly, put fork down in between bites.  Discussed importance of incorporating these healthy eating habits in order to optimize post-operative outcome.  F/u 1 month

## 2024-02-12 ENCOUNTER — APPOINTMENT (OUTPATIENT)
Dept: PSYCHIATRY | Facility: CLINIC | Age: 29
End: 2024-02-12
Payer: COMMERCIAL

## 2024-02-12 PROCEDURE — 90832 PSYTX W PT 30 MINUTES: CPT | Mod: 95

## 2024-02-13 NOTE — REASON FOR VISIT
[Follow-Up Visit] : a follow-up visit for [Morbid Obesity (BMI>40)] : morbid obesity (bmi>40) [Home] : at home, [unfilled] , at the time of the visit. [Other Location: e.g. Home (Enter Location, City,State)___] : at [unfilled] [Patient] : the patient

## 2024-02-13 NOTE — DISCUSSION/SUMMARY
[Conventional grooming and hygiene] : Conventional grooming and hygiene [Calm, cooperative] : Calm, cooperative [No unusual behaviors, movements, etc.] : No unusual behaviors, movements, etc. [Normal Rate/Tone/Volume] : Normal Rate/Tone/Volume [Normal Range] : Normal Range [Euthymic] : Euthymic [Logical, Goal Directed] : Logical, Goal Directed [None] : None [No delusions or hallucinations] : No delusions or hallucinations [Oriented to person, place, time] : Oriented to person, place, time [Intact/WNL] : Intact/WNL [Adequate/WNL] : Adequate/WNL [Strategies to improve adherence identified] : Strategies to improve adherence identified [Identify, practice refine strategies to promote adherence to regimen] : Identify, practice refine strategies to promote adherence to regimen [Coping skills training to overcome social/emotional barriers to adherence] : Coping skills training to overcome social/emotional barriers to adherence [de-identified] : fair [de-identified] : improving [de-identified] : depressed mood, PTSD, insufficient social support [de-identified] : improving [FreeTextEntry1] : Pt seen for follow up. She reported improvement in eating behaviors and is scheduled for an initial session with a therapist. Next follow up scheduled in 2 weeks to provide additional support in preparation for bariatric surgery.  [de-identified] : UFED- Unspecified Feeding or Eating Disorder PTSD Persistent Depressive Disorder, Postpartum Onset. [FreeTextEntry5] : compliance with behavioral and dietary recommendations

## 2024-02-23 NOTE — REASON FOR VISIT
[Home] : at home, [unfilled] , at the time of the visit. [Medical Office: ([facility])___] : at the medical office located in  [Self] : self

## 2024-02-26 ENCOUNTER — APPOINTMENT (OUTPATIENT)
Dept: SURGERY | Facility: CLINIC | Age: 29
End: 2024-02-26
Payer: COMMERCIAL

## 2024-02-26 VITALS — HEIGHT: 64 IN | BODY MASS INDEX: 44.56 KG/M2 | WEIGHT: 261 LBS

## 2024-02-26 PROCEDURE — 99213 OFFICE O/P EST LOW 20 MIN: CPT | Mod: 95

## 2024-02-26 NOTE — PHYSICAL EXAM
[Obese, well nourished, in no acute distress] : obese, well nourished, in no acute distress [Normal] : affect appropriate [de-identified] : normal respirations

## 2024-02-26 NOTE — HISTORY OF PRESENT ILLNESS
[de-identified] : JIM is a 29 year old female presenting for her 3rd monthly weight check prior to bariatric surgery.  No changes in medical history reported.  Continuing to moderate portion sizes and make more healthful choices.  Increasing activity levels as much as possible.  Has been able to cut down on soda. Struggling with hunger and cravings.

## 2024-02-27 ENCOUNTER — APPOINTMENT (OUTPATIENT)
Dept: SURGERY | Facility: CLINIC | Age: 29
End: 2024-02-27
Payer: COMMERCIAL

## 2024-02-27 PROCEDURE — 97802 MEDICAL NUTRITION INDIV IN: CPT | Mod: 95

## 2024-02-28 ENCOUNTER — APPOINTMENT (OUTPATIENT)
Dept: PSYCHIATRY | Facility: CLINIC | Age: 29
End: 2024-02-28
Payer: COMMERCIAL

## 2024-02-28 PROCEDURE — 90834 PSYTX W PT 45 MINUTES: CPT | Mod: 95

## 2024-02-29 NOTE — DISCUSSION/SUMMARY
[Conventional grooming and hygiene] : Conventional grooming and hygiene [Calm, cooperative] : Calm, cooperative [No unusual behaviors, movements, etc.] : No unusual behaviors, movements, etc. [Normal Rate/Tone/Volume] : Normal Rate/Tone/Volume [None] : None [Oriented to person, place, time] : Oriented to person, place, time [Adequate/WNL] : Adequate/WNL [Intact/WNL] : Intact/WNL [de-identified] : mild abnormal perception [de-identified] : fair [de-identified] : improving [de-identified] : depression, history of trauma [de-identified] : in need of additional support [de-identified] : UFED- Unspecified Feeding or Eating Disorder PTSD Persistent Depressive Disorder, Postpartum Onset. [FreeTextEntry1] : Pt seen for follow-up as part of preparation for bariatric surgery. She continues to struggle with depression and is still in the process of establishing a therapeutic relationship, which is recommended. Plan to follow up again in 3 weeks. [FreeTextEntry5] : compliance with behavioral and dietary recommendations

## 2024-03-19 ENCOUNTER — APPOINTMENT (OUTPATIENT)
Dept: PSYCHIATRY | Facility: CLINIC | Age: 29
End: 2024-03-19
Payer: COMMERCIAL

## 2024-03-19 PROCEDURE — 90832 PSYTX W PT 30 MINUTES: CPT | Mod: 95

## 2024-03-19 NOTE — DISCUSSION/SUMMARY
[Conventional grooming and hygiene] : Conventional grooming and hygiene [Calm, cooperative] : Calm, cooperative [No unusual behaviors, movements, etc.] : No unusual behaviors, movements, etc. [Normal Rate/Tone/Volume] : Normal Rate/Tone/Volume [Normal Range] : Normal Range [Logical, Goal Directed] : Logical, Goal Directed [None] : None [Oriented to person, place, time] : Oriented to person, place, time [Intact/WNL] : Intact/WNL [Adequate/WNL] : Adequate/WNL [Behavior plan developed] : Behavior plan developed [Strategies to improve adherence identified] : Strategies to improve adherence identified [FreeTextEntry6] : mildly dysphoric, though improved compared to last session [de-identified] : improvement in abnormal perception [de-identified] : struggling [de-identified] : fair [de-identified] : lack of social support, interrupted sleep, caregiving responsibilities, [de-identified] : In need of additional support [FreeTextEntry1] : Pt seen for follow-up as part of preparation for bariatric surgery. She has established a therapeutic relationship, her mood was less dysphoric, though she continues to struggle with following dietary recommendations. Plan to follow up again in 3 weeks.  [de-identified] : UFED- Unspecified Feeding or Eating Disorder PTSD Persistent Depressive Disorder, Postpartum Onset. [FreeTextEntry3] : Continue seeing therapist weekly Continue working with dietician toward dietary changes [FreeTextEntry5] : compliance with behavioral and dietary recommendations

## 2024-04-01 ENCOUNTER — APPOINTMENT (OUTPATIENT)
Dept: SURGERY | Facility: CLINIC | Age: 29
End: 2024-04-01
Payer: COMMERCIAL

## 2024-04-01 VITALS — WEIGHT: 258 LBS | BODY MASS INDEX: 44.05 KG/M2 | HEIGHT: 64 IN

## 2024-04-01 PROCEDURE — 99213 OFFICE O/P EST LOW 20 MIN: CPT | Mod: 95

## 2024-04-01 NOTE — PHYSICAL EXAM
[Obese, well nourished, in no acute distress] : obese, well nourished, in no acute distress [Normal] : affect appropriate [de-identified] : normal respirations

## 2024-04-01 NOTE — HISTORY OF PRESENT ILLNESS
[Medical Office: ([facility])___] : at the medical office located in  [Home] : at home, [unfilled] , at the time of the visit. [Verbal consent obtained from patient] : the patient, [unfilled] [de-identified] : JIM is a 29 year old female presenting for her 4th monthly weight check prior to bariatric surgery. No changes in medical history reported.  Continuing to moderate portion sizes and make more healthful choices.  Increasing activity levels as much as possible.  Has endoscopy scheduled.  Will also see her Gyn and will have bloodwork done.

## 2024-04-01 NOTE — PLAN
[FreeTextEntry1] : Encouraged meal planning. Emphasized proper hydration while avoiding juice and soda.    The patient is committed to learning and incorporating diet and exercise modifications to optimize success after bariatric surgery.  F/u 1 month

## 2024-04-02 ENCOUNTER — APPOINTMENT (OUTPATIENT)
Dept: SURGERY | Facility: CLINIC | Age: 29
End: 2024-04-02
Payer: COMMERCIAL

## 2024-04-02 PROCEDURE — 97802 MEDICAL NUTRITION INDIV IN: CPT | Mod: 95

## 2024-04-02 NOTE — ASU PATIENT PROFILE, ADULT - FALL HARM RISK - UNIVERSAL INTERVENTIONS
Bed in lowest position, wheels locked, appropriate side rails in place/Call bell, personal items and telephone in reach/Instruct patient to call for assistance before getting out of bed or chair/Non-slip footwear when patient is out of bed/[place] to call system/Physically safe environment - no spills, clutter or unnecessary equipment/Purposeful Proactive Rounding/Room/bathroom lighting operational, light cord in reach

## 2024-04-03 ENCOUNTER — OUTPATIENT (OUTPATIENT)
Dept: OUTPATIENT SERVICES | Facility: HOSPITAL | Age: 29
LOS: 1 days | Discharge: HOME | End: 2024-04-03
Payer: COMMERCIAL

## 2024-04-03 ENCOUNTER — TRANSCRIPTION ENCOUNTER (OUTPATIENT)
Age: 29
End: 2024-04-03

## 2024-04-03 VITALS
RESPIRATION RATE: 22 BRPM | WEIGHT: 257.94 LBS | HEART RATE: 90 BPM | SYSTOLIC BLOOD PRESSURE: 115 MMHG | DIASTOLIC BLOOD PRESSURE: 79 MMHG | TEMPERATURE: 98 F | HEIGHT: 64 IN | OXYGEN SATURATION: 100 %

## 2024-04-03 VITALS
DIASTOLIC BLOOD PRESSURE: 79 MMHG | SYSTOLIC BLOOD PRESSURE: 105 MMHG | RESPIRATION RATE: 18 BRPM | HEART RATE: 76 BPM | OXYGEN SATURATION: 100 %

## 2024-04-03 DIAGNOSIS — D50.9 IRON DEFICIENCY ANEMIA, UNSPECIFIED: ICD-10-CM

## 2024-04-03 LAB — HCG UR QL: NEGATIVE — SIGNIFICANT CHANGE UP

## 2024-04-03 PROCEDURE — 88305 TISSUE EXAM BY PATHOLOGIST: CPT | Mod: 26

## 2024-04-03 RX ORDER — SODIUM CHLORIDE 9 MG/ML
500 INJECTION, SOLUTION INTRAVENOUS
Refills: 0 | Status: DISCONTINUED | OUTPATIENT
Start: 2024-04-03 | End: 2024-04-17

## 2024-04-03 NOTE — PRE PROCEDURE NOTE - PRE PROCEDURE EVALUATION
Attending Physician:   Rusty                         Procedure: EGD    Indication for Procedure: prebariatric, B12 deficiency, heartburn, iron deficiency anemia   ________________________________________________________  PAST MEDICAL & SURGICAL HISTORY:  No pertinent past medical history      No significant past surgical history        ALLERGIES:  No Known Allergies    HOME MEDICATIONS:  PreNata oral tablet, chewable: 1 tab(s) orally once a day      PERTINENT LAB DATA:                      PHYSICAL EXAMINATION:    T(C): --  HR: --  BP: --  RR: --  SpO2: --    Constitutional: NAD  HEENT: Anicteric, EOMI   Neck:  No JVD  Respiratory: Normal respiratory effort, no accessory muscle use  Cardiovascular: S1 and S2, normal rate  Gastrointestinal: BS+, soft, NT/ND  Extremities: No peripheral edema  Neurological: A/O x 3, no focal deficits  Psychiatric: Normal mood, normal affect  Skin: No rashes on exposed skin    COMMENTS:    The patient is a suitable candidate for the planned procedure unless box checked [ ]  No, explain:

## 2024-04-03 NOTE — ASU DISCHARGE PLAN (ADULT/PEDIATRIC) - NSDISCH_IV_ENDO_ALL_CORE_FT
If your Intravenous (IV) site becomes red, swollen or painful, call your doctor. Complex Repair And Rhombic Flap Text: The defect edges were debeveled with a #15 scalpel blade.  The primary defect was closed partially with a complex linear closure.  Given the location of the remaining defect, shape of the defect and the proximity to free margins a rhombic flap was deemed most appropriate for complete closure of the defect.  Using a sterile surgical marker, an appropriate advancement flap was drawn incorporating the defect and placing the expected incisions within the relaxed skin tension lines where possible.    The area thus outlined was incised deep to adipose tissue with a #15 scalpel blade.  The skin margins were undermined to an appropriate distance in all directions utilizing iris scissors.

## 2024-04-10 ENCOUNTER — APPOINTMENT (OUTPATIENT)
Dept: PSYCHIATRY | Facility: CLINIC | Age: 29
End: 2024-04-10
Payer: COMMERCIAL

## 2024-04-10 PROCEDURE — 90834 PSYTX W PT 45 MINUTES: CPT | Mod: 95

## 2024-04-11 NOTE — DISCUSSION/SUMMARY
[Conventional grooming and hygiene] : Conventional grooming and hygiene [Calm, cooperative] : Calm, cooperative [No unusual behaviors, movements, etc.] : No unusual behaviors, movements, etc. [Normal Rate/Tone/Volume] : Normal Rate/Tone/Volume [Normal Range] : Normal Range [Euthymic] : Euthymic [Logical, Goal Directed] : Logical, Goal Directed [None] : None [No delusions or hallucinations] : No delusions or hallucinations [Oriented to person, place, time] : Oriented to person, place, time [Intact/WNL] : Intact/WNL [Adequate/WNL] : Adequate/WNL [Behavior plan developed] : Behavior plan developed [Strategies to improve adherence identified] : Strategies to improve adherence identified [Identify, practice refine strategies to promote adherence to regimen] : Identify, practice refine strategies to promote adherence to regimen [de-identified] : improving [de-identified] : improving [de-identified] : Caregiving responsibilities [de-identified] : improving [FreeTextEntry1] : Pt seen for continued follow-up in preparation for bariatric surgery. She has been making progress with adherence to dietary and behavioral recommendations. [de-identified] : UFED- Unspecified Feeding or Eating Disorder PTSD Persistent Depressive Disorder, Postpartum Onset (in early remission) [FreeTextEntry5] : compliance with behavioral and dietary recommendations

## 2024-04-12 LAB — SURGICAL PATHOLOGY STUDY: SIGNIFICANT CHANGE UP

## 2024-04-21 LAB
25(OH)D3 SERPL-MCNC: 14.8 NG/ML
ALBUMIN SERPL ELPH-MCNC: 4.4 G/DL
ALBUMIN SERPL ELPH-MCNC: 4.4 G/DL
ALP BLD-CCNC: 62 U/L
ALP BLD-CCNC: 63 U/L
ALT SERPL-CCNC: 11 U/L
ALT SERPL-CCNC: 12 U/L
ANION GAP SERPL CALC-SCNC: 13 MMOL/L
ANION GAP SERPL CALC-SCNC: 14 MMOL/L
APTT BLD: 37.2 SEC
AST SERPL-CCNC: 12 U/L
AST SERPL-CCNC: 13 U/L
BILIRUB SERPL-MCNC: 0.2 MG/DL
BILIRUB SERPL-MCNC: 0.3 MG/DL
BUN SERPL-MCNC: 13 MG/DL
BUN SERPL-MCNC: 13 MG/DL
CALCIUM SERPL-MCNC: 9.5 MG/DL
CALCIUM SERPL-MCNC: 9.7 MG/DL
CHLORIDE SERPL-SCNC: 102 MMOL/L
CHLORIDE SERPL-SCNC: 103 MMOL/L
CHOLEST SERPL-MCNC: 212 MG/DL
CO2 SERPL-SCNC: 23 MMOL/L
CO2 SERPL-SCNC: 25 MMOL/L
CREAT SERPL-MCNC: 0.61 MG/DL
CREAT SERPL-MCNC: 0.62 MG/DL
EGFR: 123 ML/MIN/1.73M2
EGFR: 123 ML/MIN/1.73M2
ESTIMATED AVERAGE GLUCOSE: 123 MG/DL
FOLATE SERPL-MCNC: 8.9 NG/ML
GLUCOSE SERPL-MCNC: 100 MG/DL
GLUCOSE SERPL-MCNC: 100 MG/DL
HBA1C MFR BLD HPLC: 5.9 %
HCG SERPL QL: NEGATIVE
HDLC SERPL-MCNC: 50 MG/DL
INR PPP: 1.01 RATIO
LDLC SERPL CALC-MCNC: 140 MG/DL
NONHDLC SERPL-MCNC: 162 MG/DL
PAPP-A SERPL-ACNC: <1 MIU/ML
POTASSIUM SERPL-SCNC: 4.2 MMOL/L
POTASSIUM SERPL-SCNC: 4.2 MMOL/L
PROT SERPL-MCNC: 7.2 G/DL
PROT SERPL-MCNC: 7.2 G/DL
PT BLD: 11.4 SEC
SODIUM SERPL-SCNC: 139 MMOL/L
SODIUM SERPL-SCNC: 139 MMOL/L
TRIGL SERPL-MCNC: 121 MG/DL
TSH SERPL-ACNC: 1.02 UIU/ML
VIT B12 SERPL-MCNC: 522 PG/ML

## 2024-04-22 LAB — APO LP(A) SERPL-MCNC: 265.5 NMOL/L

## 2024-04-25 NOTE — REASON FOR VISIT
[Home] : at home, [unfilled] , at the time of the visit. [Medical Office: ([facility])___] : at the medical office located in  [Self] : self

## 2024-04-26 ENCOUNTER — APPOINTMENT (OUTPATIENT)
Dept: SURGERY | Facility: CLINIC | Age: 29
End: 2024-04-26
Payer: COMMERCIAL

## 2024-04-26 VITALS — WEIGHT: 257 LBS | BODY MASS INDEX: 44.11 KG/M2

## 2024-04-26 DIAGNOSIS — E66.01 MORBID (SEVERE) OBESITY DUE TO EXCESS CALORIES: ICD-10-CM

## 2024-04-26 PROCEDURE — 99213 OFFICE O/P EST LOW 20 MIN: CPT | Mod: 95

## 2024-04-26 NOTE — HISTORY OF PRESENT ILLNESS
[de-identified] : JIM is a 29 year old female presenting for her 45h monthly weight check prior to bariatric surgery. No changes in medical history reported.  Continuing to moderate portion sizes and make more healthful choices.  Increasing activity levels as much as possible.  Has cut out soda and eating smaller portions.

## 2024-04-26 NOTE — PHYSICAL EXAM
[Obese, well nourished, in no acute distress] : obese, well nourished, in no acute distress [Normal] : affect appropriate [de-identified] : normal respirations

## 2024-05-09 ENCOUNTER — APPOINTMENT (OUTPATIENT)
Dept: PSYCHIATRY | Facility: CLINIC | Age: 29
End: 2024-05-09
Payer: COMMERCIAL

## 2024-05-09 VITALS — WEIGHT: 264 LBS | HEIGHT: 64 IN | BODY MASS INDEX: 45.07 KG/M2

## 2024-05-09 PROCEDURE — 90832 PSYTX W PT 30 MINUTES: CPT | Mod: 95

## 2024-05-09 NOTE — DISCUSSION/SUMMARY
[Conventional grooming and hygiene] : Conventional grooming and hygiene [Calm, cooperative] : Calm, cooperative [No unusual behaviors, movements, etc.] : No unusual behaviors, movements, etc. [Normal Rate/Tone/Volume] : Normal Rate/Tone/Volume [Normal Range] : Normal Range [Euthymic] : Euthymic [Logical, Goal Directed] : Logical, Goal Directed [None] : None [No delusions or hallucinations] : No delusions or hallucinations [Oriented to person, place, time] : Oriented to person, place, time [Intact/WNL] : Intact/WNL [Adequate/WNL] : Adequate/WNL [Behavior plan developed] : Behavior plan developed [Strategies to improve adherence identified] : Strategies to improve adherence identified [Develop and refine illness management to behavior plan] : Develop and refine illness management to behavior plan [Identify, practice refine strategies to promote adherence to regimen] : Identify, practice refine strategies to promote adherence to regimen [de-identified] : fair [de-identified] : improving [de-identified] : going on vacation [de-identified] : improving [FreeTextEntry1] : Pt seen for follow up. She reported having a setback while on vacation, gaining back the weight she had lost. Pt was aware of how her eating behaviors on vacation had affected her and expressed regret. Pt reported returning to her dietary recommendations immediately upon returning home. Session discussed moderation and encouraged pt to plan ahead for future challenging occasions. Plan to follow up again in 2 weeks. [de-identified] : UFED- Unspecified Feeding or Eating Disorder PTSD Persistent Depressive Disorder, Postpartum Onset (in early remission). [FreeTextEntry5] : compliance with behavioral and dietary recommendations

## 2024-05-23 ENCOUNTER — APPOINTMENT (OUTPATIENT)
Dept: PSYCHIATRY | Facility: CLINIC | Age: 29
End: 2024-05-23
Payer: COMMERCIAL

## 2024-05-23 PROCEDURE — 90832 PSYTX W PT 30 MINUTES: CPT | Mod: 95

## 2024-05-23 NOTE — DISCUSSION/SUMMARY
[Conventional grooming and hygiene] : Conventional grooming and hygiene [Calm, cooperative] : Calm, cooperative [No unusual behaviors, movements, etc.] : No unusual behaviors, movements, etc. [Normal Rate/Tone/Volume] : Normal Rate/Tone/Volume [Normal Range] : Normal Range [Euthymic] : Euthymic [Logical, Goal Directed] : Logical, Goal Directed [None] : None [No delusions or hallucinations] : No delusions or hallucinations [Oriented to person, place, time] : Oriented to person, place, time [Intact/WNL] : Intact/WNL [Adequate/WNL] : Adequate/WNL [Behavior plan developed] : Behavior plan developed [Strategies to improve adherence identified] : Strategies to improve adherence identified [Develop and refine illness management to behavior plan] : Develop and refine illness management to behavior plan [Identify, practice refine strategies to promote adherence to regimen] : Identify, practice refine strategies to promote adherence to regimen [de-identified] : good [de-identified] : improving [de-identified] : cravings [de-identified] : Improving [FreeTextEntry1] : Pt seen for continued follow up, reported continued efforts toward following bariatric recommendations. Session also addressed importance of pregnancy prevention following bariatric surgery. Next appointment scheduled in 2 weeks.   [de-identified] : UFED- Unspecified Feeding or Eating Disorder PTSD Persistent Depressive Disorder, Postpartum Onset (in early remission).    [FreeTextEntry5] : compliance with behavioral and dietary recommendations [FreeTextEntry6] : reduced risk of medical sequelae of obesity

## 2024-05-23 NOTE — DISCUSSION/SUMMARY
[Conventional grooming and hygiene] : Conventional grooming and hygiene [Calm, cooperative] : Calm, cooperative [No unusual behaviors, movements, etc.] : No unusual behaviors, movements, etc. [Normal Rate/Tone/Volume] : Normal Rate/Tone/Volume [Normal Range] : Normal Range [Euthymic] : Euthymic [Logical, Goal Directed] : Logical, Goal Directed [None] : None [No delusions or hallucinations] : No delusions or hallucinations [Oriented to person, place, time] : Oriented to person, place, time [Intact/WNL] : Intact/WNL [Adequate/WNL] : Adequate/WNL [Behavior plan developed] : Behavior plan developed [Strategies to improve adherence identified] : Strategies to improve adherence identified [Develop and refine illness management to behavior plan] : Develop and refine illness management to behavior plan [Identify, practice refine strategies to promote adherence to regimen] : Identify, practice refine strategies to promote adherence to regimen [de-identified] : good [de-identified] : improving [de-identified] : cravings [de-identified] : Improving [FreeTextEntry1] : Pt seen for continued follow up, reported continued efforts toward following bariatric recommendations. Session also addressed importance of pregnancy prevention following bariatric surgery. Next appointment scheduled in 2 weeks.   [de-identified] : UFED- Unspecified Feeding or Eating Disorder PTSD Persistent Depressive Disorder, Postpartum Onset (in early remission).    [FreeTextEntry5] : compliance with behavioral and dietary recommendations [FreeTextEntry6] : reduced risk of medical sequelae of obesity

## 2024-06-06 ENCOUNTER — APPOINTMENT (OUTPATIENT)
Dept: PSYCHIATRY | Facility: CLINIC | Age: 29
End: 2024-06-06

## 2024-06-06 PROCEDURE — 90832 PSYTX W PT 30 MINUTES: CPT | Mod: 95

## 2024-06-07 NOTE — DISCUSSION/SUMMARY
[Conventional grooming and hygiene] : Conventional grooming and hygiene [Calm, cooperative] : Calm, cooperative [No unusual behaviors, movements, etc.] : No unusual behaviors, movements, etc. [Normal Rate/Tone/Volume] : Normal Rate/Tone/Volume [Normal Range] : Normal Range [Euthymic] : Euthymic [Logical, Goal Directed] : Logical, Goal Directed [None] : None [No delusions or hallucinations] : No delusions or hallucinations [Oriented to person, place, time] : Oriented to person, place, time [Intact/WNL] : Intact/WNL [Adequate/WNL] : Adequate/WNL [Strategies to improve adherence identified] : Strategies to improve adherence identified [Develop and refine illness management to behavior plan] : Develop and refine illness management to behavior plan [de-identified] : good [de-identified] : improving [de-identified] : Limited time due to being a [facility] with 3 kids at home. [de-identified] : receptive to recommendations of care team, consistent with appointments   [FreeTextEntry1] : Pt seen for follow up at her request. Pt expressed negative self-talk due to imperfections in following dietary protocol despite vast improvements. Session encouraged positive self-talk and meeting with RD for specific recommendations for improvement. [de-identified] : UFED- Unspecified Feeding or Eating Disorder PTSD Persistent Depressive Disorder, Postpartum Onset (in early remission). [FreeTextEntry5] : compliance with behavioral and dietary recommendations

## 2024-06-20 ENCOUNTER — APPOINTMENT (OUTPATIENT)
Dept: PSYCHIATRY | Facility: CLINIC | Age: 29
End: 2024-06-20
Payer: COMMERCIAL

## 2024-06-20 VITALS — BODY MASS INDEX: 44.05 KG/M2 | WEIGHT: 258 LBS | HEIGHT: 64 IN

## 2024-06-20 PROCEDURE — 90832 PSYTX W PT 30 MINUTES: CPT | Mod: 95

## 2024-06-21 NOTE — DISCUSSION/SUMMARY
[Conventional grooming and hygiene] : Conventional grooming and hygiene [Calm, cooperative] : Calm, cooperative [No unusual behaviors, movements, etc.] : No unusual behaviors, movements, etc. [Normal Rate/Tone/Volume] : Normal Rate/Tone/Volume [Normal Range] : Normal Range [Euthymic] : Euthymic [Logical, Goal Directed] : Logical, Goal Directed [None] : None [No delusions or hallucinations] : No delusions or hallucinations [Oriented to person, place, time] : Oriented to person, place, time [Intact/WNL] : Intact/WNL [Adequate/WNL] : Adequate/WNL [Strategies to improve adherence identified] : Strategies to improve adherence identified [Identify, practice refine strategies to promote adherence to regimen] : Identify, practice refine strategies to promote adherence to regimen [de-identified] : improving [de-identified] : improving [de-identified] : Questions about food choices [de-identified] : improving [FreeTextEntry1] : Pt seen for continued follow up as she prepares for bariatric surgery. She continues to make progress toward adherence with presurgical dietary recommendations. Plan to follow up again 3 weeks for continued support.  [de-identified] : UFED- Unspecified Feeding or Eating Disorder PTSD Persistent Depressive Disorder, Postpartum Onset (in remission). [FreeTextEntry5] : compliance with behavioral and dietary recommendations

## 2024-07-10 ENCOUNTER — APPOINTMENT (OUTPATIENT)
Dept: PSYCHIATRY | Facility: CLINIC | Age: 29
End: 2024-07-10
Payer: COMMERCIAL

## 2024-07-10 PROCEDURE — 90832 PSYTX W PT 30 MINUTES: CPT | Mod: 95

## 2024-07-29 ENCOUNTER — APPOINTMENT (OUTPATIENT)
Dept: SURGERY | Facility: CLINIC | Age: 29
End: 2024-07-29
Payer: COMMERCIAL

## 2024-07-29 PROCEDURE — 97802 MEDICAL NUTRITION INDIV IN: CPT | Mod: 95

## 2024-07-30 ENCOUNTER — APPOINTMENT (OUTPATIENT)
Dept: PSYCHIATRY | Facility: CLINIC | Age: 29
End: 2024-07-30

## 2024-07-30 PROCEDURE — 90832 PSYTX W PT 30 MINUTES: CPT | Mod: 95

## 2024-08-02 ENCOUNTER — APPOINTMENT (OUTPATIENT)
Dept: SURGERY | Facility: CLINIC | Age: 29
End: 2024-08-02
Payer: COMMERCIAL

## 2024-08-02 VITALS
HEIGHT: 64 IN | BODY MASS INDEX: 43.02 KG/M2 | OXYGEN SATURATION: 98 % | DIASTOLIC BLOOD PRESSURE: 75 MMHG | HEART RATE: 99 BPM | SYSTOLIC BLOOD PRESSURE: 126 MMHG | RESPIRATION RATE: 16 BRPM | WEIGHT: 252 LBS | TEMPERATURE: 96.6 F

## 2024-08-02 PROCEDURE — 99214 OFFICE O/P EST MOD 30 MIN: CPT

## 2024-08-02 NOTE — DISCUSSION/SUMMARY
[Conventional grooming and hygiene] : Conventional grooming and hygiene [Calm, cooperative] : Calm, cooperative [No unusual behaviors, movements, etc.] : No unusual behaviors, movements, etc. [Normal Rate/Tone/Volume] : Normal Rate/Tone/Volume [Normal Range] : Normal Range [Euthymic] : Euthymic [Logical, Goal Directed] : Logical, Goal Directed [None] : None [No delusions or hallucinations] : No delusions or hallucinations [Oriented to person, place, time] : Oriented to person, place, time [Intact/WNL] : Intact/WNL [Adequate/WNL] : Adequate/WNL [Behavior plan developed] : Behavior plan developed [Strategies to improve adherence identified] : Strategies to improve adherence identified [Identify, practice refine strategies to promote adherence to regimen] : Identify, practice refine strategies to promote adherence to regimen [de-identified] : improved [de-identified] : improved [de-identified] : improved [de-identified] : improved [FreeTextEntry1] : Pt seen for additional support as she prepares for bariatric surgery. She reported positive and stable mood, adherence to recommended diet, improved coping skills, and readiness for surgery. Plan to follow up again with pt prior to surgery to continue to offer support.  [de-identified] : UFED- Unspecified Feeding or Eating Disorder [FreeTextEntry5] : continued compliance with behavioral and dietary recommendations

## 2024-08-09 ENCOUNTER — OUTPATIENT (OUTPATIENT)
Dept: OUTPATIENT SERVICES | Facility: HOSPITAL | Age: 29
LOS: 1 days | End: 2024-08-09

## 2024-08-09 VITALS
SYSTOLIC BLOOD PRESSURE: 121 MMHG | OXYGEN SATURATION: 98 % | DIASTOLIC BLOOD PRESSURE: 85 MMHG | HEIGHT: 64 IN | HEART RATE: 94 BPM | TEMPERATURE: 98 F | RESPIRATION RATE: 16 BRPM | WEIGHT: 242.95 LBS

## 2024-08-09 DIAGNOSIS — E66.01 MORBID (SEVERE) OBESITY DUE TO EXCESS CALORIES: ICD-10-CM

## 2024-08-09 DIAGNOSIS — G47.33 OBSTRUCTIVE SLEEP APNEA (ADULT) (PEDIATRIC): ICD-10-CM

## 2024-08-09 DIAGNOSIS — Z98.891 HISTORY OF UTERINE SCAR FROM PREVIOUS SURGERY: Chronic | ICD-10-CM

## 2024-08-09 PROBLEM — Z78.9 OTHER SPECIFIED HEALTH STATUS: Chronic | Status: INACTIVE | Noted: 2019-03-24 | Resolved: 2024-08-09

## 2024-08-09 LAB
A1C WITH ESTIMATED AVERAGE GLUCOSE RESULT: 5.5 % — SIGNIFICANT CHANGE UP (ref 4–5.6)
ANION GAP SERPL CALC-SCNC: 17 MMOL/L — HIGH (ref 7–14)
BASOPHILS # BLD AUTO: 0.05 K/UL — SIGNIFICANT CHANGE UP (ref 0–0.2)
BASOPHILS NFR BLD AUTO: 0.6 % — SIGNIFICANT CHANGE UP (ref 0–2)
BLD GP AB SCN SERPL QL: NEGATIVE — SIGNIFICANT CHANGE UP
BUN SERPL-MCNC: 9 MG/DL — SIGNIFICANT CHANGE UP (ref 7–23)
CALCIUM SERPL-MCNC: 9.4 MG/DL — SIGNIFICANT CHANGE UP (ref 8.4–10.5)
CHLORIDE SERPL-SCNC: 100 MMOL/L — SIGNIFICANT CHANGE UP (ref 98–107)
CO2 SERPL-SCNC: 21 MMOL/L — LOW (ref 22–31)
CREAT SERPL-MCNC: 0.49 MG/DL — LOW (ref 0.5–1.3)
EGFR: 130 ML/MIN/1.73M2 — SIGNIFICANT CHANGE UP
EOSINOPHIL # BLD AUTO: 0.17 K/UL — SIGNIFICANT CHANGE UP (ref 0–0.5)
EOSINOPHIL NFR BLD AUTO: 2.1 % — SIGNIFICANT CHANGE UP (ref 0–6)
ESTIMATED AVERAGE GLUCOSE: 111 — SIGNIFICANT CHANGE UP
GLUCOSE SERPL-MCNC: 65 MG/DL — LOW (ref 70–99)
HCG UR QL: NEGATIVE — SIGNIFICANT CHANGE UP
HCT VFR BLD CALC: 43.7 % — SIGNIFICANT CHANGE UP (ref 34.5–45)
HGB BLD-MCNC: 13.5 G/DL — SIGNIFICANT CHANGE UP (ref 11.5–15.5)
IMM GRANULOCYTES NFR BLD AUTO: 0.2 % — SIGNIFICANT CHANGE UP (ref 0–0.9)
LYMPHOCYTES # BLD AUTO: 2.4 K/UL — SIGNIFICANT CHANGE UP (ref 1–3.3)
LYMPHOCYTES # BLD AUTO: 29.3 % — SIGNIFICANT CHANGE UP (ref 13–44)
MCHC RBC-ENTMCNC: 25.4 PG — LOW (ref 27–34)
MCHC RBC-ENTMCNC: 30.9 GM/DL — LOW (ref 32–36)
MCV RBC AUTO: 82.3 FL — SIGNIFICANT CHANGE UP (ref 80–100)
MONOCYTES # BLD AUTO: 0.58 K/UL — SIGNIFICANT CHANGE UP (ref 0–0.9)
MONOCYTES NFR BLD AUTO: 7.1 % — SIGNIFICANT CHANGE UP (ref 2–14)
NEUTROPHILS # BLD AUTO: 4.96 K/UL — SIGNIFICANT CHANGE UP (ref 1.8–7.4)
NEUTROPHILS NFR BLD AUTO: 60.7 % — SIGNIFICANT CHANGE UP (ref 43–77)
PLATELET # BLD AUTO: 378 K/UL — SIGNIFICANT CHANGE UP (ref 150–400)
POTASSIUM SERPL-MCNC: 3.7 MMOL/L — SIGNIFICANT CHANGE UP (ref 3.5–5.3)
POTASSIUM SERPL-SCNC: 3.7 MMOL/L — SIGNIFICANT CHANGE UP (ref 3.5–5.3)
RBC # BLD: 5.31 M/UL — HIGH (ref 3.8–5.2)
RBC # FLD: 14.8 % — HIGH (ref 10.3–14.5)
RH IG SCN BLD-IMP: POSITIVE — SIGNIFICANT CHANGE UP
RH IG SCN BLD-IMP: POSITIVE — SIGNIFICANT CHANGE UP
SODIUM SERPL-SCNC: 138 MMOL/L — SIGNIFICANT CHANGE UP (ref 135–145)
WBC # BLD: 8.18 K/UL — SIGNIFICANT CHANGE UP (ref 3.8–10.5)
WBC # FLD AUTO: 8.18 K/UL — SIGNIFICANT CHANGE UP (ref 3.8–10.5)

## 2024-08-09 NOTE — H&P PST ADULT - PROBLEM SELECTOR PLAN 1
Patient tentatively scheduled for laprscopic gastric sleeve     Pre-op instructions provided.  Famotidine provided with instructions. Hibiclens provided with instructions and was signed by patient. Teach-back method was utilized to assess patient's understanding. Patient verbalized understanding.    ordered CBC, BMP,A1c, UCG, type/retype Patient tentatively scheduled for laparoscopic gastric sleeve     Pre-op instructions provided.  Famotidine provided with instructions. Hibiclens provided with instructions and was signed by patient. Teach-back method was utilized to assess patient's understanding. Patient verbalized understanding.    ordered CBC, BMP,A1c, UCG, type/retype

## 2024-08-09 NOTE — H&P PST ADULT - HISTORY OF PRESENT ILLNESS
Patient is 29 year old female with longstanding history of morbid obesity (BMI 45) , mild JOSE  presented to [facility] for  evaluation  for planned laparoscopic sleeve gastrectomy.

## 2024-08-09 NOTE — H&P PST ADULT - LAST ECHOCARDIOGRAM
Jan 25 2024-- EF 71%-- Normal left/right ventricular cavity size, wall thickness, and normal systolic function Jan 25 2024-- EF 71%-- Normal left/right ventricular cavity size, wall thickness, and normal systolic function---results available in all scripts

## 2024-08-09 NOTE — H&P PST ADULT - LAST STRESS TEST
Jan 2024---No ST changes-- no evidence of Ischemia Jan 2024---No ST changes-- no evidence of Ischemia--results available in all scripts

## 2024-08-12 ENCOUNTER — APPOINTMENT (OUTPATIENT)
Dept: PSYCHIATRY | Facility: CLINIC | Age: 29
End: 2024-08-12
Payer: COMMERCIAL

## 2024-08-12 VITALS — HEIGHT: 64 IN | WEIGHT: 245 LBS | BODY MASS INDEX: 41.83 KG/M2

## 2024-08-12 PROCEDURE — 90832 PSYTX W PT 30 MINUTES: CPT | Mod: 95

## 2024-08-12 NOTE — DISCUSSION/SUMMARY
[Calm, cooperative] : Calm, cooperative [No unusual behaviors, movements, etc.] : No unusual behaviors, movements, etc. [Normal Rate/Tone/Volume] : Normal Rate/Tone/Volume [Normal Range] : Normal Range [Logical, Goal Directed] : Logical, Goal Directed [None] : None [No delusions or hallucinations] : No delusions or hallucinations [Oriented to person, place, time] : Oriented to person, place, time [Intact/WNL] : Intact/WNL [Adequate/WNL] : Adequate/WNL [Strategies to improve adherence identified] : Strategies to improve adherence identified [Identify, practice refine strategies to promote adherence to regimen] : Identify, practice refine strategies to promote adherence to regimen [FreeTextEntry6] : mild worries about upcoming changes [de-identified] : fair [de-identified] : good [de-identified] : improved [de-identified] : improved [FreeTextEntry1] : Pt was seen for final follow-up visit before bariatric surgery. Pt reported continued adherence to recommendations and readiness for surgery. Plan to follow up again post-surgically on 9/3.  [de-identified] : UFED- Unspecified Feeding or Eating Disorder [FreeTextEntry5] : continued compliance with behavioral and dietary recommendations

## 2024-08-13 PROBLEM — E66.01 MORBID (SEVERE) OBESITY DUE TO EXCESS CALORIES: Chronic | Status: ACTIVE | Noted: 2024-08-09

## 2024-08-13 PROBLEM — G47.33 OBSTRUCTIVE SLEEP APNEA (ADULT) (PEDIATRIC): Chronic | Status: ACTIVE | Noted: 2024-08-09

## 2024-08-14 ENCOUNTER — TRANSCRIPTION ENCOUNTER (OUTPATIENT)
Age: 29
End: 2024-08-14

## 2024-08-15 ENCOUNTER — RESULT REVIEW (OUTPATIENT)
Age: 29
End: 2024-08-15

## 2024-08-15 ENCOUNTER — APPOINTMENT (OUTPATIENT)
Dept: SURGERY | Facility: HOSPITAL | Age: 29
End: 2024-08-15
Payer: COMMERCIAL

## 2024-08-15 PROCEDURE — 43775 LAP SLEEVE GASTRECTOMY: CPT | Mod: 82

## 2024-08-15 PROCEDURE — 43775 LAP SLEEVE GASTRECTOMY: CPT

## 2024-08-15 PROCEDURE — S2900 ROBOTIC SURGICAL SYSTEM: CPT | Mod: NC

## 2024-08-16 ENCOUNTER — TRANSCRIPTION ENCOUNTER (OUTPATIENT)
Age: 29
End: 2024-08-16

## 2024-08-16 ENCOUNTER — APPOINTMENT (OUTPATIENT)
Dept: CARDIOLOGY | Facility: CLINIC | Age: 29
End: 2024-08-16

## 2024-08-16 DIAGNOSIS — E66.01 MORBID (SEVERE) OBESITY DUE TO EXCESS CALORIES: ICD-10-CM

## 2024-08-16 DIAGNOSIS — Z00.00 ENCOUNTER FOR GENERAL ADULT MEDICAL EXAMINATION W/OUT ABNORMAL FINDINGS: ICD-10-CM

## 2024-08-16 RX ORDER — LORATADINE 10 MG
17 TABLET,DISINTEGRATING ORAL
Qty: 0 | Refills: 0 | DISCHARGE

## 2024-08-16 RX ORDER — CYANOCOBALAMIN/FOLIC AC/VIT B6 2-2.5-25MG
15 TABLET ORAL
Refills: 0 | DISCHARGE

## 2024-08-21 ENCOUNTER — NON-APPOINTMENT (OUTPATIENT)
Age: 29
End: 2024-08-21

## 2024-09-03 ENCOUNTER — APPOINTMENT (OUTPATIENT)
Dept: PSYCHIATRY | Facility: CLINIC | Age: 29
End: 2024-09-03
Payer: COMMERCIAL

## 2024-09-03 VITALS — BODY MASS INDEX: 39.09 KG/M2 | HEIGHT: 64 IN | WEIGHT: 229 LBS

## 2024-09-03 PROCEDURE — 90832 PSYTX W PT 30 MINUTES: CPT | Mod: 95

## 2024-09-03 NOTE — DISCUSSION/SUMMARY
[Conventional grooming and hygiene] : Conventional grooming and hygiene [Calm, cooperative] : Calm, cooperative [No unusual behaviors, movements, etc.] : No unusual behaviors, movements, etc. [Normal Rate/Tone/Volume] : Normal Rate/Tone/Volume [Normal Range] : Normal Range [Logical, Goal Directed] : Logical, Goal Directed [None] : None [No delusions or hallucinations] : No delusions or hallucinations [Oriented to person, place, time] : Oriented to person, place, time [Intact/WNL] : Intact/WNL [Adequate/WNL] : Adequate/WNL [FreeTextEntry6] : mild dysphoria and worry [de-identified] : good [de-identified] : good [de-identified] : interpersonal stressors [de-identified] : improving [Identify, practice refine strategies to promote adherence to regimen] : Identify, practice refine strategies to promote adherence to regimen [FreeTextEntry1] : Pt seen for postsurgical follow up. She reported good adherence to recommendations. Pt discussed some interpersonal stressors. Next appt scheduled in 2 weeks.  [de-identified] : UFED- Unspecified Feeding or Eating Disorder (in early remission) [FreeTextEntry5] : compliance with behavioral and dietary recommendations

## 2024-09-03 NOTE — REASON FOR VISIT
[Home] : at home, [unfilled] , at the time of the visit. [Other Location: e.g. Home (Enter Location, City,State)___] : at [unfilled] [Patient] : the patient [Follow-Up Visit] : a follow-up visit for [Morbid Obesity (BMI<40)] : morbid obesity (bmi<40)

## 2024-09-06 ENCOUNTER — APPOINTMENT (OUTPATIENT)
Dept: SURGERY | Facility: CLINIC | Age: 29
End: 2024-09-06
Payer: COMMERCIAL

## 2024-09-06 VITALS
WEIGHT: 227.31 LBS | BODY MASS INDEX: 38.81 KG/M2 | HEART RATE: 92 BPM | DIASTOLIC BLOOD PRESSURE: 70 MMHG | TEMPERATURE: 96.4 F | RESPIRATION RATE: 16 BRPM | OXYGEN SATURATION: 99 % | HEIGHT: 64 IN | SYSTOLIC BLOOD PRESSURE: 103 MMHG

## 2024-09-06 DIAGNOSIS — R11.0 NAUSEA: ICD-10-CM

## 2024-09-06 DIAGNOSIS — Z98.84 BARIATRIC SURGERY STATUS: ICD-10-CM

## 2024-09-06 DIAGNOSIS — E44.1 MILD PROTEIN-CALORIE MALNUTRITION: ICD-10-CM

## 2024-09-06 PROCEDURE — 99024 POSTOP FOLLOW-UP VISIT: CPT

## 2024-09-06 RX ORDER — ONDANSETRON 4 MG/1
4 TABLET, ORALLY DISINTEGRATING ORAL EVERY 8 HOURS
Qty: 12 | Refills: 0 | Status: ACTIVE | COMMUNITY
Start: 2024-09-06 | End: 1900-01-01

## 2024-09-06 NOTE — PHYSICAL EXAM
[Obese, well nourished, in no acute distress] : obese, well nourished, in no acute distress [Normal] : affect appropriate [de-identified] : normal respirations [de-identified] : Soft, nontender, nondistended.  Incisions well-healed without erythema or drainage.

## 2024-09-06 NOTE — ASSESSMENT
[FreeTextEntry1] : 29-year-old female recovering well status post robotic assisted laparoscopic sleeve gastrectomy on 8/15/2024.

## 2024-09-06 NOTE — HISTORY OF PRESENT ILLNESS
[de-identified] : JIM is a 29 year old female here for s/p Robotic-assisted laparoscopic sleeve gastrectomy.08/15/24 Recovering comfortably without major complaints.  Minimal incisional pain.   Tolerating bariatric liquids, including protein shakes, without dysphagia or GERD.  Ambulating well.  No fevers or chills reported.  She does have some mild nausea that is alleviated with Zofran.

## 2024-09-06 NOTE — HISTORY OF PRESENT ILLNESS
[de-identified] : JIM is a 29 year old female here for s/p Robotic-assisted laparoscopic sleeve gastrectomy.08/15/24 Recovering comfortably without major complaints.  Minimal incisional pain.   Tolerating bariatric liquids, including protein shakes, without dysphagia or GERD.  Ambulating well.  No fevers or chills reported.  She does have some mild nausea that is alleviated with Zofran.

## 2024-09-06 NOTE — PLAN
[FreeTextEntry1] : [] Advance to pureed foods as per bariatric diet instructions at 2 weeks [] emphasized importance of maintaining excellent hydration, monitoring urine output and color, and keeping a bottle of water available at all times [] 1-2 protein shakes per day  [] walk as much as tolerated  F/u 2 weeks

## 2024-09-06 NOTE — PHYSICAL EXAM
[Obese, well nourished, in no acute distress] : obese, well nourished, in no acute distress [Normal] : affect appropriate [de-identified] : normal respirations [de-identified] : Soft, nontender, nondistended.  Incisions well-healed without erythema or drainage.

## 2024-09-16 ENCOUNTER — APPOINTMENT (OUTPATIENT)
Dept: SURGERY | Facility: CLINIC | Age: 29
End: 2024-09-16
Payer: COMMERCIAL

## 2024-09-16 PROCEDURE — 97802 MEDICAL NUTRITION INDIV IN: CPT | Mod: 95

## 2024-09-17 ENCOUNTER — APPOINTMENT (OUTPATIENT)
Dept: PSYCHIATRY | Facility: CLINIC | Age: 29
End: 2024-09-17

## 2024-09-17 VITALS — WEIGHT: 225 LBS | BODY MASS INDEX: 38.41 KG/M2 | HEIGHT: 64 IN

## 2024-09-17 PROCEDURE — 90832 PSYTX W PT 30 MINUTES: CPT | Mod: 95

## 2024-09-27 ENCOUNTER — APPOINTMENT (OUTPATIENT)
Dept: SURGERY | Facility: CLINIC | Age: 29
End: 2024-09-27
Payer: COMMERCIAL

## 2024-09-27 VITALS
HEIGHT: 64 IN | BODY MASS INDEX: 37.61 KG/M2 | WEIGHT: 220.31 LBS | RESPIRATION RATE: 16 BRPM | HEART RATE: 80 BPM | DIASTOLIC BLOOD PRESSURE: 83 MMHG | OXYGEN SATURATION: 99 % | TEMPERATURE: 97.3 F | SYSTOLIC BLOOD PRESSURE: 128 MMHG

## 2024-09-27 DIAGNOSIS — Z98.84 BARIATRIC SURGERY STATUS: ICD-10-CM

## 2024-09-27 DIAGNOSIS — E44.1 MILD PROTEIN-CALORIE MALNUTRITION: ICD-10-CM

## 2024-09-27 PROCEDURE — 99024 POSTOP FOLLOW-UP VISIT: CPT

## 2024-09-27 NOTE — PHYSICAL EXAM
[Obese, well nourished, in no acute distress] : obese, well nourished, in no acute distress [Normal] : affect appropriate [de-identified] : normal respirations [de-identified] : Soft, nontender, nondistended.  Incisions well-healed without erythema or drainage.

## 2024-09-27 NOTE — HISTORY OF PRESENT ILLNESS
[de-identified] : JIM is a 29 year old female s/p Robotic-assisted laparoscopic sleeve gastrectomy 08/15/24 She presents today for scheduled postoperative appointment.  Continues to do very well, with adequate weight loss, and reports no dysphagia, reflux, or abdominal pain.  Walks for exercise.

## 2024-09-27 NOTE — PLAN
[FreeTextEntry1] : [] continue bariatric diet advancement per instructions [] reviewed importance of eating slowly, chewing thoroughly, stop eating when full [] recommend increasing cardiovascular exercise, goal 30 minutes per day [] continue multivitamins [] check nutrition panel [] f/u in 2 months

## 2024-09-27 NOTE — PHYSICAL EXAM
[Obese, well nourished, in no acute distress] : obese, well nourished, in no acute distress [Normal] : affect appropriate [de-identified] : normal respirations [de-identified] : Soft, nontender, nondistended.  Incisions well-healed without erythema or drainage.

## 2024-09-27 NOTE — ASSESSMENT
[FreeTextEntry1] : 29-year-old female status post robotic assisted laparoscopic sleeve gastrectomy on 8/15/2024.

## 2024-09-27 NOTE — HISTORY OF PRESENT ILLNESS
[de-identified] : JIM is a 29 year old female s/p Robotic-assisted laparoscopic sleeve gastrectomy 08/15/24 She presents today for scheduled postoperative appointment.  Continues to do very well, with adequate weight loss, and reports no dysphagia, reflux, or abdominal pain.  Walks for exercise.

## 2024-10-15 ENCOUNTER — APPOINTMENT (OUTPATIENT)
Dept: PSYCHIATRY | Facility: CLINIC | Age: 29
End: 2024-10-15
Payer: COMMERCIAL

## 2024-10-15 VITALS — BODY MASS INDEX: 36.7 KG/M2 | WEIGHT: 215 LBS | HEIGHT: 64 IN

## 2024-10-15 PROCEDURE — 90832 PSYTX W PT 30 MINUTES: CPT | Mod: 95

## 2024-11-11 ENCOUNTER — APPOINTMENT (OUTPATIENT)
Dept: PSYCHIATRY | Facility: CLINIC | Age: 29
End: 2024-11-11

## 2024-11-11 VITALS — WEIGHT: 207 LBS | HEIGHT: 64 IN | BODY MASS INDEX: 35.34 KG/M2

## 2024-11-11 PROCEDURE — 90834 PSYTX W PT 45 MINUTES: CPT | Mod: 95

## 2024-11-13 NOTE — H&P PST ADULT - PROBLEM SELECTOR PLAN 2
Child, Adolescent, and Family Clinic Orientation Seminar   Orientation/Psychoeducation       Patient's attendance: Attended in Full      Seminar/Group Focus:  Child, Adolescent, and Family Clinic Orientation Seminar       Site: [facility]   Clinical status of patient: Outpatient   No LOS. Billing Provider and Co-signer: Chucky Zapata LCSW   Length of Service: 35 minutes       Seminar/Group Topic:  Behavioral Health   Seminar/Group Department: [facility] 4thfl   Seminar/Group Facilitators:  Karie Armendariz LMSW  # of patients: 14       Interval history: Parent/caregiver presented for the Child, Adolescent, and Family Clinic orientation seminar. The seminar provided information regarding guidelines and structure of assessment, diagnosis, and treatment in this clinic.   Diagnosis: No diagnosis found.   Patient's response: Accepting   Progress toward goals and other mental status changes: As expected       Plan: Parent/caregiver will indicate whether to proceed with the Child, Adolescent, and Family Clinic Caregiver intake   Return to clinic: as scheduled        David precautions JOSE precautions

## 2024-11-22 ENCOUNTER — APPOINTMENT (OUTPATIENT)
Dept: SURGERY | Facility: CLINIC | Age: 29
End: 2024-11-22
Payer: COMMERCIAL

## 2024-11-22 VITALS
BODY MASS INDEX: 34.21 KG/M2 | OXYGEN SATURATION: 99 % | DIASTOLIC BLOOD PRESSURE: 83 MMHG | HEIGHT: 64 IN | TEMPERATURE: 97.2 F | SYSTOLIC BLOOD PRESSURE: 111 MMHG | HEART RATE: 76 BPM | WEIGHT: 200.38 LBS | RESPIRATION RATE: 16 BRPM

## 2024-11-22 DIAGNOSIS — Z98.84 BARIATRIC SURGERY STATUS: ICD-10-CM

## 2024-11-22 DIAGNOSIS — E44.1 MILD PROTEIN-CALORIE MALNUTRITION: ICD-10-CM

## 2024-11-22 PROCEDURE — 99212 OFFICE O/P EST SF 10 MIN: CPT

## 2024-11-22 RX ORDER — ERGOCALCIFEROL 1.25 MG/1
1.25 MG CAPSULE ORAL
Qty: 12 | Refills: 1 | Status: ACTIVE | COMMUNITY
Start: 2024-11-22 | End: 1900-01-01

## 2024-11-25 ENCOUNTER — APPOINTMENT (OUTPATIENT)
Dept: PSYCHIATRY | Facility: CLINIC | Age: 29
End: 2024-11-25
Payer: COMMERCIAL

## 2024-11-25 PROCEDURE — 90834 PSYTX W PT 45 MINUTES: CPT | Mod: 95

## 2024-12-23 ENCOUNTER — APPOINTMENT (OUTPATIENT)
Dept: PSYCHIATRY | Facility: CLINIC | Age: 29
End: 2024-12-23
Payer: COMMERCIAL

## 2024-12-23 VITALS — HEIGHT: 64 IN | WEIGHT: 195 LBS | BODY MASS INDEX: 33.29 KG/M2

## 2024-12-23 PROCEDURE — 90834 PSYTX W PT 45 MINUTES: CPT | Mod: 95

## 2025-01-03 NOTE — PATIENT PROFILE OB - ALCOHOL USE HISTORY SINGLE SELECT
Encounter addended by: Priti Balbuena RN on: 1/3/2025 9:59 AM   Actions taken: Charge Capture section accepted
never

## 2025-01-21 ENCOUNTER — APPOINTMENT (OUTPATIENT)
Dept: PSYCHIATRY | Facility: CLINIC | Age: 30
End: 2025-01-21
Payer: COMMERCIAL

## 2025-01-21 PROCEDURE — 90832 PSYTX W PT 30 MINUTES: CPT | Mod: 95

## 2025-01-22 VITALS — WEIGHT: 195 LBS | HEIGHT: 64 IN | BODY MASS INDEX: 33.29 KG/M2

## 2025-01-31 ENCOUNTER — NON-APPOINTMENT (OUTPATIENT)
Age: 30
End: 2025-01-31

## 2025-03-04 ENCOUNTER — APPOINTMENT (OUTPATIENT)
Dept: PSYCHIATRY | Facility: CLINIC | Age: 30
End: 2025-03-04
Payer: COMMERCIAL

## 2025-03-04 VITALS — HEIGHT: 64 IN | BODY MASS INDEX: 30.22 KG/M2 | WEIGHT: 177 LBS

## 2025-03-04 PROCEDURE — 90834 PSYTX W PT 45 MINUTES: CPT | Mod: 95

## 2025-03-14 ENCOUNTER — APPOINTMENT (OUTPATIENT)
Dept: SURGERY | Facility: CLINIC | Age: 30
End: 2025-03-14
Payer: COMMERCIAL

## 2025-03-14 VITALS
HEART RATE: 91 BPM | OXYGEN SATURATION: 99 % | WEIGHT: 175.25 LBS | BODY MASS INDEX: 29.92 KG/M2 | HEIGHT: 64 IN | TEMPERATURE: 97.1 F | RESPIRATION RATE: 16 BRPM | DIASTOLIC BLOOD PRESSURE: 66 MMHG | SYSTOLIC BLOOD PRESSURE: 99 MMHG

## 2025-03-14 DIAGNOSIS — Z98.84 BARIATRIC SURGERY STATUS: ICD-10-CM

## 2025-03-14 DIAGNOSIS — K59.01 SLOW TRANSIT CONSTIPATION: ICD-10-CM

## 2025-03-14 PROCEDURE — 99213 OFFICE O/P EST LOW 20 MIN: CPT

## 2025-05-06 ENCOUNTER — APPOINTMENT (OUTPATIENT)
Dept: PSYCHIATRY | Facility: CLINIC | Age: 30
End: 2025-05-06

## 2025-05-06 VITALS — BODY MASS INDEX: 28.68 KG/M2 | WEIGHT: 168 LBS | HEIGHT: 64 IN

## 2025-05-06 PROCEDURE — 90834 PSYTX W PT 45 MINUTES: CPT | Mod: 95

## 2025-05-12 ENCOUNTER — APPOINTMENT (OUTPATIENT)
Dept: SURGERY | Facility: CLINIC | Age: 30
End: 2025-05-12
Payer: COMMERCIAL

## 2025-05-12 PROCEDURE — 97803 MED NUTRITION INDIV SUBSEQ: CPT | Mod: 95

## 2025-09-08 ENCOUNTER — APPOINTMENT (OUTPATIENT)
Dept: PSYCHIATRY | Facility: CLINIC | Age: 30
End: 2025-09-08
Payer: COMMERCIAL

## 2025-09-08 VITALS — HEIGHT: 64 IN | WEIGHT: 155 LBS | BODY MASS INDEX: 26.46 KG/M2

## 2025-09-08 PROCEDURE — 90791 PSYCH DIAGNOSTIC EVALUATION: CPT | Mod: 95

## 2025-09-19 ENCOUNTER — APPOINTMENT (OUTPATIENT)
Dept: SURGERY | Facility: CLINIC | Age: 30
End: 2025-09-19

## (undated) DEVICE — TUBING SUCTION NONCONDUCTIVE 6MM X 12FT

## (undated) DEVICE — BIOPSY FORCEP RADIAL JAW 4 STANDARD WITH NEEDLE

## (undated) DEVICE — DRSG 2X2

## (undated) DEVICE — BASIN EMESIS 10IN GRADUATED MAUVE

## (undated) DEVICE — ELCTR ECG CONDUCTIVE ADHESIVE

## (undated) DEVICE — DRSG BANDAID 0.75X3"

## (undated) DEVICE — LUBRICATING JELLY HR ONE SHOT 3G

## (undated) DEVICE — CATH IV SAFE BC 22G X 1" (BLUE)

## (undated) DEVICE — PACK IV START WITH CHG

## (undated) DEVICE — TUBING IV SET GRAVITY 3Y 100" MACRO

## (undated) DEVICE — ELCTR GROUNDING PAD ADULT COVIDIEN

## (undated) DEVICE — SALIVA EJECTOR (BLUE)

## (undated) DEVICE — DRSG CURITY GAUZE SPONGE 4 X 4" 12-PLY NON-STERILE

## (undated) DEVICE — CONTAINER FORMALIN 80ML YELLOW

## (undated) DEVICE — BIOPSY FORCEP COLD DISP

## (undated) DEVICE — GOWN LG

## (undated) DEVICE — TUBING MEDI-VAC W MAXIGRIP CONNECTORS 1/4"X6'